# Patient Record
Sex: MALE | Race: WHITE
[De-identification: names, ages, dates, MRNs, and addresses within clinical notes are randomized per-mention and may not be internally consistent; named-entity substitution may affect disease eponyms.]

---

## 2019-02-06 ENCOUNTER — HOSPITAL ENCOUNTER (EMERGENCY)
Dept: HOSPITAL 96 - M.ERS | Age: 64
Discharge: HOME | End: 2019-02-06
Payer: COMMERCIAL

## 2019-02-06 VITALS — BODY MASS INDEX: 27.3 KG/M2 | WEIGHT: 195 LBS | HEIGHT: 71 IN

## 2019-02-06 VITALS — SYSTOLIC BLOOD PRESSURE: 145 MMHG | DIASTOLIC BLOOD PRESSURE: 72 MMHG
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
## 2019-02-06 DIAGNOSIS — Z95.5: ICD-10-CM

## 2019-02-06 DIAGNOSIS — Z88.0: ICD-10-CM

## 2019-02-06 DIAGNOSIS — M19.90: ICD-10-CM

## 2019-02-06 DIAGNOSIS — Z88.8: ICD-10-CM

## 2019-02-06 DIAGNOSIS — S82.492A: Primary | ICD-10-CM

## 2019-02-06 DIAGNOSIS — I25.2: ICD-10-CM

## 2019-02-06 LAB
ABSOLUTE BASOPHILS: 0 THOU/UL (ref 0–0.2)
ABSOLUTE EOSINOPHILS: 0.2 THOU/UL (ref 0–0.7)
ABSOLUTE MONOCYTES: 0.6 THOU/UL (ref 0–1.2)
ALBUMIN SERPL-MCNC: 3.3 G/DL (ref 3.4–5)
ALP SERPL-CCNC: 73 U/L (ref 46–116)
ALT SERPL-CCNC: 23 U/L (ref 30–65)
ANION GAP SERPL CALC-SCNC: 7 MMOL/L (ref 7–16)
APTT BLD: 25.6 SECONDS (ref 25–31.3)
AST SERPL-CCNC: 16 U/L (ref 15–37)
BASOPHILS NFR BLD AUTO: 0.4 %
BILIRUB SERPL-MCNC: 0.5 MG/DL
BUN SERPL-MCNC: 15 MG/DL (ref 7–18)
CALCIUM SERPL-MCNC: 9.3 MG/DL (ref 8.5–10.1)
CHLORIDE SERPL-SCNC: 103 MMOL/L (ref 98–107)
CO2 SERPL-SCNC: 28 MMOL/L (ref 21–32)
CREAT SERPL-MCNC: 1.4 MG/DL (ref 0.6–1.3)
EOSINOPHIL NFR BLD: 3 %
GLUCOSE SERPL-MCNC: 91 MG/DL (ref 70–99)
GRANULOCYTES NFR BLD MANUAL: 57.5 %
HCT VFR BLD CALC: 40.5 % (ref 42–52)
HGB BLD-MCNC: 13.9 GM/DL (ref 14–18)
INR PPP: 1
LYMPHOCYTES # BLD: 2.5 THOU/UL (ref 0.8–5.3)
LYMPHOCYTES NFR BLD AUTO: 31.5 %
MCH RBC QN AUTO: 30.9 PG (ref 26–34)
MCHC RBC AUTO-ENTMCNC: 34.4 G/DL (ref 28–37)
MCV RBC: 89.9 FL (ref 80–100)
MONOCYTES NFR BLD: 7.6 %
MPV: 7.3 FL. (ref 7.2–11.1)
NEUTROPHILS # BLD: 4.6 THOU/UL (ref 1.6–8.1)
NUCLEATED RBCS: 0 /100WBC
PLATELET COUNT*: 200 THOU/UL (ref 150–400)
POTASSIUM SERPL-SCNC: 4.1 MMOL/L (ref 3.5–5.1)
PROT SERPL-MCNC: 6.6 G/DL (ref 6.4–8.2)
PROTHROMBIN TIME: 10.2 SECONDS (ref 9.2–11.5)
RBC # BLD AUTO: 4.5 MIL/UL (ref 4.5–6)
RDW-CV: 16.8 % (ref 10.5–14.5)
SODIUM SERPL-SCNC: 138 MMOL/L (ref 136–145)
TROPONIN-I LEVEL: <0.06 NG/ML (ref ?–0.06)
WBC # BLD AUTO: 8.1 THOU/UL (ref 4–11)

## 2019-02-07 NOTE — EKG
[place] [address]
Phone:  [phone]                     ELECTROCARDIOGRAM REPORT      
_______________________________________________________________________________
 
Name:       MARILOU MCCAIN              [facility]                      [id number]      Account #:      [id number]  
Admission:  19     Attend Phys:                         
Discharge:  19     Date of Birth:  [birth year]  
         Report #: [id number]
    [id number]
_______________________________________________________________________________
THIS REPORT FOR:  //name//                      
 
                         [facility] ED
                                       
Test Date:    2019               Test Time:    17:39:26
Pat Name:     MARILOU MCCAIN          Department:   
Patient ID:   [id number]            Room:          
Gender:       M                        Technician:   BENNIE
:          1955               Requested By: Elli Tran
Order Number: [id number] MD:   Laron Busby
                                 Measurements
Intervals                              Axis          
Rate:         64                       P:            32
LA:           161                      QRS:          -10
QRSD:         98                       T:            -9
QT:           444                                    
QTc:          458                                    
                           Interpretive Statements
Sinus rhythm
Borderline T abnormalities, inferior leads
No previous ECG available for comparison
 
Electronically Signed On 2019 15:29:54 CST by Laron Busby
[email or web address]
 
 
 
 
 
 
 
 
 
 
 
 
 
 
 
 
 
 
 
  <ELECTRONICALLY SIGNED>
                                           By: Laron Busby MD, [facility]     
  19     1529
D: 19 1739   _____________________________________
T: 19 1739   Laron Busby MD, FACC       /EPI

## 2019-04-08 ENCOUNTER — HOSPITAL ENCOUNTER (OUTPATIENT)
Dept: HOSPITAL 96 - M.RAD | Age: 64
End: 2019-04-08
Attending: FAMILY MEDICINE
Payer: COMMERCIAL

## 2019-04-08 DIAGNOSIS — J84.9: ICD-10-CM

## 2019-04-08 DIAGNOSIS — J43.9: Primary | ICD-10-CM

## 2019-05-22 ENCOUNTER — HOSPITAL ENCOUNTER (OUTPATIENT)
Dept: HOSPITAL 96 - M.CRD | Age: 64
End: 2019-05-22
Attending: INTERNAL MEDICINE
Payer: COMMERCIAL

## 2019-05-22 DIAGNOSIS — I35.1: Primary | ICD-10-CM

## 2019-05-22 DIAGNOSIS — Z88.8: ICD-10-CM

## 2019-05-22 DIAGNOSIS — I25.5: ICD-10-CM

## 2019-05-22 DIAGNOSIS — Z88.0: ICD-10-CM

## 2019-06-05 ENCOUNTER — HOSPITAL ENCOUNTER (OUTPATIENT)
Dept: HOSPITAL 96 - M.LAB | Age: 64
End: 2019-06-05
Attending: INTERNAL MEDICINE
Payer: COMMERCIAL

## 2019-06-05 DIAGNOSIS — K74.60: ICD-10-CM

## 2019-06-05 DIAGNOSIS — I77.1: Primary | ICD-10-CM

## 2019-06-05 LAB
ABSOLUTE BASOPHILS: 0.1 THOU/UL (ref 0–0.2)
ABSOLUTE EOSINOPHILS: 0.4 THOU/UL (ref 0–0.7)
ABSOLUTE MONOCYTES: 0.6 THOU/UL (ref 0–1.2)
ALBUMIN SERPL-MCNC: 3.7 G/DL (ref 3.4–5)
ALP SERPL-CCNC: 84 U/L (ref 46–116)
ALT SERPL-CCNC: 22 U/L (ref 30–65)
ANION GAP SERPL CALC-SCNC: 10 MMOL/L (ref 7–16)
AST SERPL-CCNC: 18 U/L (ref 15–37)
BASOPHILS NFR BLD AUTO: 0.9 %
BILIRUB SERPL-MCNC: 0.7 MG/DL
BUN SERPL-MCNC: 18 MG/DL (ref 7–18)
CALCIUM SERPL-MCNC: 9.2 MG/DL (ref 8.5–10.1)
CHLORIDE SERPL-SCNC: 104 MMOL/L (ref 98–107)
CO2 SERPL-SCNC: 24 MMOL/L (ref 21–32)
CREAT SERPL-MCNC: 1 MG/DL (ref 0.6–1.3)
EOSINOPHIL NFR BLD: 3.7 %
GLUCOSE SERPL-MCNC: 114 MG/DL (ref 70–99)
GRANULOCYTES NFR BLD MANUAL: 69.7 %
HCT VFR BLD CALC: 47.5 % (ref 42–52)
HGB BLD-MCNC: 16.1 GM/DL (ref 14–18)
INR PPP: 1.1
IRON SERPL-MCNC: 96 UG/DL (ref 50–175)
LYMPHOCYTES # BLD: 1.9 THOU/UL (ref 0.8–5.3)
LYMPHOCYTES NFR BLD AUTO: 19.3 %
MCH RBC QN AUTO: 30.4 PG (ref 26–34)
MCHC RBC AUTO-ENTMCNC: 33.9 G/DL (ref 28–37)
MCV RBC: 89.5 FL (ref 80–100)
MONOCYTES NFR BLD: 6.4 %
MPV: 8.4 FL. (ref 7.2–11.1)
NEUTROPHILS # BLD: 6.9 THOU/UL (ref 1.6–8.1)
NUCLEATED RBCS: 0 /100WBC
PLATELET # BLD EST: ADEQUATE 10*3/UL
PLATELET CLUMP BLD QL SMEAR: (no result)
PLATELET COUNT*: 171 THOU/UL (ref 150–400)
POTASSIUM SERPL-SCNC: 4.2 MMOL/L (ref 3.5–5.1)
PROT SERPL-MCNC: 7.1 G/DL (ref 6.4–8.2)
PROTHROMBIN TIME: 10.9 SECONDS (ref 9.2–11.5)
RBC # BLD AUTO: 5.31 MIL/UL (ref 4.5–6)
RBC MORPH BLD: NORMAL
RDW-CV: 14 % (ref 10.5–14.5)
SAO2 % BLD FROM PO2: 31 % (ref 20–39)
SODIUM SERPL-SCNC: 138 MMOL/L (ref 136–145)
WBC # BLD AUTO: 9.9 THOU/UL (ref 4–11)

## 2019-06-12 ENCOUNTER — HOSPITAL ENCOUNTER (OUTPATIENT)
Dept: HOSPITAL 96 - M.SUR | Age: 64
Discharge: HOME | End: 2019-06-12
Attending: INTERNAL MEDICINE
Payer: COMMERCIAL

## 2019-06-12 DIAGNOSIS — K74.60: ICD-10-CM

## 2019-06-12 DIAGNOSIS — Z88.0: ICD-10-CM

## 2019-06-12 DIAGNOSIS — I25.10: ICD-10-CM

## 2019-06-12 DIAGNOSIS — E03.9: ICD-10-CM

## 2019-06-12 DIAGNOSIS — K21.9: ICD-10-CM

## 2019-06-12 DIAGNOSIS — K64.4: ICD-10-CM

## 2019-06-12 DIAGNOSIS — Z12.11: Primary | ICD-10-CM

## 2019-06-12 DIAGNOSIS — Z86.010: ICD-10-CM

## 2019-06-12 DIAGNOSIS — M06.9: ICD-10-CM

## 2019-06-12 DIAGNOSIS — I50.9: ICD-10-CM

## 2019-06-12 DIAGNOSIS — I25.2: ICD-10-CM

## 2019-06-12 DIAGNOSIS — Z79.899: ICD-10-CM

## 2019-06-12 DIAGNOSIS — J44.9: ICD-10-CM

## 2019-06-12 DIAGNOSIS — Z98.0: ICD-10-CM

## 2019-06-12 DIAGNOSIS — Z88.8: ICD-10-CM

## 2019-06-12 DIAGNOSIS — Z98.890: ICD-10-CM

## 2019-06-12 DIAGNOSIS — Z87.442: ICD-10-CM

## 2019-09-11 ENCOUNTER — HOSPITAL ENCOUNTER (OUTPATIENT)
Dept: HOSPITAL 96 - M.NUC | Age: 64
End: 2019-09-11
Attending: INTERNAL MEDICINE
Payer: COMMERCIAL

## 2019-09-11 DIAGNOSIS — I25.2: ICD-10-CM

## 2019-09-11 DIAGNOSIS — I25.10: Primary | ICD-10-CM

## 2019-09-11 DIAGNOSIS — Z88.8: ICD-10-CM

## 2019-09-11 DIAGNOSIS — Z79.899: ICD-10-CM

## 2019-09-11 DIAGNOSIS — Z88.0: ICD-10-CM

## 2019-09-11 NOTE — CARDNUC
[place] [address]
Phone:  [phone]                     CARDIAC NUCLEAR IMAGING REPORT
_______________________________________________________________________________
 
Name:         MITCHELL,HUBERT DUANE         Room:                     REG CLI
M.R.#:    [id number]     Account #:     [id number]  
Admission:    09/11/19    Attend Phys:   Hank Cleveland,
Discharge:                Date of Birth: [birth year]  
Date of Service: 09/11/19 1922  Report #:      [id number]
        [id number] 
_______________________________________________________________________________
THIS REPORT FOR:  //name//                      
 
 
--------------- APPROVED REPORT --------------
 
 
Study performed:  09/11/2019 09:39:41
 
Exam: Nuclear Stress Test
Indication: Chest pain, Dyspnea
Patient Location: Out-Patient
Stress Tech: Yamilet Hutchins
Stress Nurse: Romina Norwood RN
 
Ht: 5 ft 11 in  Wt: 190 lbs  BSA:  2.06 m2
    BMI:  26.49
 
Medical History
Medical History: mi, cad,
Medications: carvedilol, asa-81, lasix, brilinta
Allergies: penicillin, pravastatin
Cardiac Risk Factors: age, former tobacco use, family hx
Previous Cardiac Procedures: pci
Exercise History: Sedentary
Meds Held (24 hrs): carvedilol
 
Stress Test Details
Stress Test:  Pharmacologic stress testing performed using 0.4 mg of 
regadenoson per 5 mL given IV over 10 seconds.      
  Reason for pharmacologic stress test: physical limitation.      
  Reversal agent Aminophyline 60 mg, given intravenously for 
dyspnea.      
HR           
Resting HR:            65 bpm   Max Heart Rate (APMHR): 158 bpm  
Max HR Achieved:  85 bpm   Target HR (85% APMHR): 134 bpm  
% of APMHR:         53         
Recovery HR:            82 bpm        
 
BP           
Resting BP:  105/85 mmHg        
Max BP:       155/42 mmHg        
 
ECG           
Resting ECG:  Sinus Rhythm        
Stress ECG:     Sinus Rhythm        
ST Change: None          
 
 
[place] [address]
Phone:  [phone]                     CARDIAC NUCLEAR IMAGING REPORT
_______________________________________________________________________________
 
Name:         MITCHELL,HUBERT DUANE         Room:                     REG CLI
M.R.#:    [id number]     Account #:     [id number]  
Admission:    09/11/19    Attend Phys:   Hank Cleveland,
Discharge:                Date of Birth: [birth year]  
Date of Service: 09/11/19 1922  Report #:      [id number]
        [id number] 
_______________________________________________________________________________
Arrhythmia:    VPC's         
Recovery ECG: Sinus Rhythm        
Recovery ST Change: None        
Recovery Arrhythmia: VPC's        
 
Clinical
Reason for Termination: Completed protocol
Exercise duration: 0 min  sec
Exercise capacity: 1 METs
Patient had no significant cardiac symptoms with Lexiscan 
infusion.
 
Nurse Comments
pt unable to walk on treadmill.  pt uses cane to walk and is on 02 
therapy.  
pt had many pvcs during test
caffeine was used to reverse pt, not aminophylline
 
Stress ECG Conclusion
The baseline EKG shows sinus rhythm with frequent unifocal premature 
ventricular contractions. EKGs obtained during and post Lexiscan 
infusion show sinus rhythm with no significant ST or T wave changes 
when compared to baseline. There continued to be frequent unifocal 
premature ventricular contractions noted.
 
NM EXAM: Myocardial Perfusion REST/STRESS
 
Study Quality
Study: Good
Artifact: No artifact 
 
Study Data
At rest, the left ventricular ejection fraction was 44%..   
Post stress, the left ventricular ejection was 26%..   
TID = 1.09.       
 
Perfusion
Perfusion images show a large in size moderate to severe intensity 
defect involving the basal to distal inferior wall that appears 
somewhat worse on stress than rest images suggesting infarct with 
minimal aleksey-infarct ischemia. Additionally there is moderate region 
of mild intensity reversibility noted in the anterior and 
anterolateral walls
 
Wall Motion
Global LV systolic function appears to be severely decreased. The 
 
 
[place] [address]
Phone:  [phone]                     CARDIAC NUCLEAR IMAGING REPORT
_______________________________________________________________________________
 
Name:         ADÁN,HUBERT DUANE         Room:                     REG CLI
M.R.#:    [id number]     Account #:     [id number]  
Admission:    09/11/19    Attend Phys:   Hank Cleveland,
Discharge:                Date of Birth: [birth year]  
Date of Service: 09/11/19 1922  Report #:      [id number]
        [id number] 
_______________________________________________________________________________
inferior wall is akinetic. The apex is severely hypokinetic. The left 
ventricle appears moderately dilated.
 
Nuclear Conclusion
ECG Findings: negative for ischemia
Clinical Findings: negative for ischemia
Nuclear Findings: positive for ischemia
Exercise Capacity: not assessed
Left Ventricular Function: abnormal
Risk Study: high
Myocardial perfusion images suggest prior infarct of the inferior 
wall with residual ischemia in the inferior wall. There is also 
evidence of ischemia in the anterolateral wall. There is significant 
LV systolic dysfunction with wall motion abnormalities as outlined 
above. This is a high risk study. 
 
<Conclusion>
The baseline EKG shows sinus rhythm with frequent unifocal premature 
ventricular contractions. EKGs obtained during and post Lexiscan 
infusion show sinus rhythm with no significant ST or T wave changes 
when compared to baseline. There continued to be frequent unifocal 
premature ventricular contractions noted.
 
 
 
 
 
 
 
 
 
 
 
 
 
 
 
 
 
 
 
 
 
 
  <ELECTRONICALLY SIGNED>
                                           [facility]: [facility] Cleveland [facility], [facility]   
  09/11/19 1922
D: 09/11/19 1922   _____________________________________
T: 09/11/19 1922   Hank Cleveland MD, FACC     /INF

## 2019-09-13 ENCOUNTER — HOSPITAL ENCOUNTER (INPATIENT)
Dept: HOSPITAL 96 - M.ERS | Age: 64
LOS: 3 days | Discharge: ANOTHER HOSPITAL | DRG: 281 | End: 2019-09-16
Attending: INTERNAL MEDICINE | Admitting: INTERNAL MEDICINE
Payer: COMMERCIAL

## 2019-09-13 VITALS — DIASTOLIC BLOOD PRESSURE: 70 MMHG | SYSTOLIC BLOOD PRESSURE: 114 MMHG

## 2019-09-13 VITALS — BODY MASS INDEX: 44.1 KG/M2 | HEIGHT: 70.98 IN | WEIGHT: 315 LBS

## 2019-09-13 VITALS — DIASTOLIC BLOOD PRESSURE: 55 MMHG | SYSTOLIC BLOOD PRESSURE: 94 MMHG

## 2019-09-13 VITALS — SYSTOLIC BLOOD PRESSURE: 132 MMHG | DIASTOLIC BLOOD PRESSURE: 75 MMHG

## 2019-09-13 DIAGNOSIS — Z79.899: ICD-10-CM

## 2019-09-13 DIAGNOSIS — J44.9: ICD-10-CM

## 2019-09-13 DIAGNOSIS — I21.9: Primary | ICD-10-CM

## 2019-09-13 DIAGNOSIS — N18.3: ICD-10-CM

## 2019-09-13 DIAGNOSIS — K21.9: ICD-10-CM

## 2019-09-13 DIAGNOSIS — Z79.52: ICD-10-CM

## 2019-09-13 DIAGNOSIS — M06.9: ICD-10-CM

## 2019-09-13 DIAGNOSIS — Z87.891: ICD-10-CM

## 2019-09-13 DIAGNOSIS — I12.9: ICD-10-CM

## 2019-09-13 DIAGNOSIS — I25.5: ICD-10-CM

## 2019-09-13 DIAGNOSIS — E03.9: ICD-10-CM

## 2019-09-13 DIAGNOSIS — Z88.0: ICD-10-CM

## 2019-09-13 DIAGNOSIS — E11.22: ICD-10-CM

## 2019-09-13 DIAGNOSIS — E78.5: ICD-10-CM

## 2019-09-13 DIAGNOSIS — E86.0: ICD-10-CM

## 2019-09-13 DIAGNOSIS — M81.0: ICD-10-CM

## 2019-09-13 DIAGNOSIS — I25.2: ICD-10-CM

## 2019-09-13 DIAGNOSIS — J84.9: ICD-10-CM

## 2019-09-13 DIAGNOSIS — Z88.8: ICD-10-CM

## 2019-09-13 DIAGNOSIS — J84.10: ICD-10-CM

## 2019-09-13 DIAGNOSIS — Z79.51: ICD-10-CM

## 2019-09-13 DIAGNOSIS — N17.9: ICD-10-CM

## 2019-09-13 DIAGNOSIS — Z82.3: ICD-10-CM

## 2019-09-13 DIAGNOSIS — I25.110: ICD-10-CM

## 2019-09-13 DIAGNOSIS — Z95.5: ICD-10-CM

## 2019-09-13 DIAGNOSIS — Z79.82: ICD-10-CM

## 2019-09-13 DIAGNOSIS — I24.9: ICD-10-CM

## 2019-09-13 LAB
ABSOLUTE BASOPHILS: 0.1 THOU/UL (ref 0–0.2)
ABSOLUTE EOSINOPHILS: 0.3 THOU/UL (ref 0–0.7)
ABSOLUTE MONOCYTES: 0.6 THOU/UL (ref 0–1.2)
ALBUMIN SERPL-MCNC: 3.7 G/DL (ref 3.4–5)
ALP SERPL-CCNC: 83 U/L (ref 46–116)
ALT SERPL-CCNC: 25 U/L (ref 30–65)
ANION GAP SERPL CALC-SCNC: 8 MMOL/L (ref 7–16)
AST SERPL-CCNC: 18 U/L (ref 15–37)
BASOPHILS NFR BLD AUTO: 1.4 %
BILIRUB SERPL-MCNC: 0.5 MG/DL
BUN SERPL-MCNC: 25 MG/DL (ref 7–18)
CALCIUM SERPL-MCNC: 8.8 MG/DL (ref 8.5–10.1)
CHLORIDE SERPL-SCNC: 103 MMOL/L (ref 98–107)
CO2 SERPL-SCNC: 26 MMOL/L (ref 21–32)
CREAT SERPL-MCNC: 1.1 MG/DL (ref 0.6–1.3)
EOSINOPHIL NFR BLD: 3.6 %
GLUCOSE SERPL-MCNC: 126 MG/DL (ref 70–99)
GRANULOCYTES NFR BLD MANUAL: 66.4 %
HCT VFR BLD CALC: 43.7 % (ref 42–52)
HGB BLD-MCNC: 15.3 GM/DL (ref 14–18)
INR PPP: 1.1
LIPASE: 221 U/L (ref 73–393)
LYMPHOCYTES # BLD: 1.8 THOU/UL (ref 0.8–5.3)
LYMPHOCYTES NFR BLD AUTO: 21.5 %
MCH RBC QN AUTO: 31.5 PG (ref 26–34)
MCHC RBC AUTO-ENTMCNC: 35.1 G/DL (ref 28–37)
MCV RBC: 89.6 FL (ref 80–100)
MONOCYTES NFR BLD: 7.1 %
MPV: 8.2 FL. (ref 7.2–11.1)
NEUTROPHILS # BLD: 5.4 THOU/UL (ref 1.6–8.1)
NT-PRO BRAIN NAT PEPTIDE: 595 PG/ML (ref ?–300)
NUCLEATED RBCS: 0 /100WBC
PLATELET COUNT*: 182 THOU/UL (ref 150–400)
POTASSIUM SERPL-SCNC: 4.3 MMOL/L (ref 3.5–5.1)
PROT SERPL-MCNC: 6.7 G/DL (ref 6.4–8.2)
PROTHROMBIN TIME: 10.9 SECONDS (ref 9.2–11.5)
RBC # BLD AUTO: 4.88 MIL/UL (ref 4.5–6)
RDW-CV: 14.1 % (ref 10.5–14.5)
SODIUM SERPL-SCNC: 137 MMOL/L (ref 136–145)
TROPONIN-I LEVEL: <0.06 NG/ML (ref ?–0.06)
WBC # BLD AUTO: 8.2 THOU/UL (ref 4–11)

## 2019-09-14 VITALS — SYSTOLIC BLOOD PRESSURE: 117 MMHG | DIASTOLIC BLOOD PRESSURE: 64 MMHG

## 2019-09-14 VITALS — DIASTOLIC BLOOD PRESSURE: 64 MMHG | SYSTOLIC BLOOD PRESSURE: 95 MMHG

## 2019-09-14 VITALS — SYSTOLIC BLOOD PRESSURE: 107 MMHG | DIASTOLIC BLOOD PRESSURE: 72 MMHG

## 2019-09-14 VITALS — DIASTOLIC BLOOD PRESSURE: 65 MMHG | SYSTOLIC BLOOD PRESSURE: 94 MMHG

## 2019-09-14 VITALS — SYSTOLIC BLOOD PRESSURE: 100 MMHG | DIASTOLIC BLOOD PRESSURE: 64 MMHG

## 2019-09-14 VITALS — SYSTOLIC BLOOD PRESSURE: 89 MMHG | DIASTOLIC BLOOD PRESSURE: 64 MMHG

## 2019-09-14 VITALS — DIASTOLIC BLOOD PRESSURE: 62 MMHG | SYSTOLIC BLOOD PRESSURE: 95 MMHG

## 2019-09-14 VITALS — SYSTOLIC BLOOD PRESSURE: 97 MMHG | DIASTOLIC BLOOD PRESSURE: 64 MMHG

## 2019-09-14 VITALS — SYSTOLIC BLOOD PRESSURE: 108 MMHG | DIASTOLIC BLOOD PRESSURE: 73 MMHG

## 2019-09-14 VITALS — DIASTOLIC BLOOD PRESSURE: 63 MMHG | SYSTOLIC BLOOD PRESSURE: 114 MMHG

## 2019-09-14 VITALS — DIASTOLIC BLOOD PRESSURE: 69 MMHG | SYSTOLIC BLOOD PRESSURE: 113 MMHG

## 2019-09-14 VITALS — SYSTOLIC BLOOD PRESSURE: 99 MMHG | DIASTOLIC BLOOD PRESSURE: 60 MMHG

## 2019-09-14 VITALS — DIASTOLIC BLOOD PRESSURE: 67 MMHG | SYSTOLIC BLOOD PRESSURE: 96 MMHG

## 2019-09-14 LAB
CHOLEST SERPL-MCNC: 140 MG/DL (ref ?–200)
HDLC SERPL-MCNC: 29 MG/DL (ref 40–?)
LDLC SERPL-MCNC: 92 MG/DL (ref ?–100)
TC:HDL: 4.8 RATIO
TRIGL SERPL-MCNC: 98 MG/DL (ref ?–150)
VLDLC SERPL CALC-MCNC: 20 MG/DL (ref ?–40)

## 2019-09-14 NOTE — NUR
ASSUMED CARE OF PATIENT THIS AM AT 0730.  PATIENT IS ALERT AND ORIENTED X 4.
HE C/O CONTINUED CHEST PAIN AT 4 ON A SCALE OF 1 TO 10.  HIS SKIN IS WARM AND
DRY.  TELE SHOWS SR WITH [facility].  DR CHRISTIANSON IN TO ROUND.  STAT CARDIAC CATH
ORDERED.  IV FLUIDS STARTED AT 75/HR.  CONSENT SIGNED.

## 2019-09-14 NOTE — EKG
[place] [address]
Phone:  [phone]                     ELECTROCARDIOGRAM REPORT      
_______________________________________________________________________________
 
Name:       MITCHELL,HUBERT DUANE          Room:           [address]    ADM [place]  
M.R.#:  [id number]      Account #:      [id number]  
Admission:  19     Attend Phys:    ANTONIO Carter
Discharge:               Date of Birth:  [birth year]  
         Report #: [id number]
    [id number]
_______________________________________________________________________________
THIS REPORT FOR:  //name//                      
 
                         [facility] ED
                                       
Test Date:    2019               Test Time:    22:51:33
Pat Name:     MARILOU MCCAIN          Department:   
Patient ID:   [id number]            Room:         [address]
Gender:       M                        Technician:   FL
:          1955               Requested By: Elli Parker
Order Number: [id number] MD:   Laron Busby
                                 Measurements
Intervals                              Axis          
Rate:         82                       P:            43
DE:           165                      QRS:          -11
QRSD:         107                      T:            1
QT:           436                                    
QTc:          510                                    
                           Interpretive Statements
Sinus rhythm
Paired ventricular premature complexes
Incomplete left bundle branch block
Inferior infarct, old
Compared to ECG 2019 17:39:26
Ventricular premature complex(es) now present
incomplete Left bundle-branch block now present
T-wave abnormality no longer present
 
Electronically Signed On 2019 12:55:18 CDT by Laron Busby
[email or web address]
 
 
 
 
 
 
 
 
 
 
 
 
 
 
  <ELECTRONICALLY SIGNED>
                                           By: Laron Busby MD, [facility]     
  19     1255
D: 19 2251   _____________________________________
T: 19 2251   Laron Busby MD, FAC       /EPI

## 2019-09-14 NOTE — NUR
Pt is A&O. Resides at home with his wife. Wears home o2 and uses a cane for
mobility. Per nurse, Pt will need to transfer to a different hospital early
next week for open heart surgery. Following.

## 2019-09-14 NOTE — EKG
[place] [address]
Phone:  [phone]                     ELECTROCARDIOGRAM REPORT      
_______________________________________________________________________________
 
Name:       MITCHELL,HUBERT DUANE          Room:           [address]    ADM [place]  
M.R.#:  [id number]      Account #:      [id number]  
Admission:  19     Attend Phys:    ANTONIO Carter
Discharge:               Date of Birth:  [birth year]  
         Report #: [id number]
    [id number]
_______________________________________________________________________________
THIS REPORT FOR:  //name//                      
 
                         [facility] ED
                                       
Test Date:    2019               Test Time:    19:57:37
Pat Name:     MARILOU MCCAIN          Department:   
Patient ID:   [id number]            Room:         [facility]
Gender:       M                        Technician:   FL
:          1955               Requested By: Elli Parker
Order Number: [id number] MD:   Laron Busby
                                 Measurements
Intervals                              Axis          
Rate:         72                       P:            45
MA:           162                      QRS:          -9
QRSD:         104                      T:            4
QT:           401                                    
QTc:          439                                    
                           Interpretive Statements
Sinus arrhythmia
Multiple ventricular premature complexes
Low voltage, extremity leads
possible inferior scar
Compared to ECG 2019 17:39:26
Ventricular premature complex(es) now present
Low QRS voltage now present
T-wave abnormality no longer present
 
Electronically Signed On 2019 12:53:35 CDT by Laron Busby
[email or web address]
 
 
 
 
 
 
 
 
 
 
 
 
 
 
  <ELECTRONICALLY SIGNED>
                                           By: Laron Busby MD, [facility]     
  19     1253
D: 19   _____________________________________
T: 19   Laron Busby MD, FAC       /EPI

## 2019-09-14 NOTE — CON
[address]  [address]                    CONSULTATION                  
_______________________________________________________________________________
 
Name:       ADÁNMARILOU DUANE          Room:           M219    ADM IN  
M.R.#:  [id number]      Account #:      [id number]  
Admission:  09/13/19     Attend Phys:    ANTONIO Carter
Discharge:               Date of Birth:  [birth year]  
         Report #: [id number]
                                                                     [id number]  
_______________________________________________________________________________
THIS REPORT FOR:  //name//                      
 
CC: Myles Chanel DO
 
DATE OF SERVICE:  09/14/2019
 
 
CARDIOLOGY CONSULTATION
 
He is in room #219.
 
Thank you for allowing us to see the patient in cardiovascular assessment.  As
you know, he is a 63-year-old male with complex coronary artery disease, status
post prior myocardial infarction and stenting.  Recently, he has noted recurrent
chest pain following a pattern of clinical instability.  Over the last several
days, he has had frequent episodes of chest discomfort similar to his prior
ischemic pain lasting several minutes.  Recent nuclear stress test was abnormal
with inferior wall infarction and aleksey-infarction ischemia and anterolateral
ischemia as well.
 
The patient denies shortness of breath or diaphoresis accompanying his pain.
 
He has a number of risk factors for coronary disease, including prior cigarette
smoking, hypercholesterolemia, diabetes and family history of coronary disease. 
There is a remote history of renal insufficiency, which is abated.
 
The patient has a known ischemic cardiomyopathy with ejection fraction of
approximately 35% with inferior wall motion abnormalities.
 
PAST MEDICAL HISTORY:  Remarkable for interstitial lung disease, kidney stones
with lithotripsy, prior acute renal insufficiency, which resolved.
 
FAMILY HISTORY:  Remarkable for a sister having a stroke.  Another sister with
stroke in her 60s.
 
SOCIAL HISTORY:  The patient is retired.  He drinks occasionally and no longer
smokes, but did in the past.
 
REVIEW OF SYSTEMS:  Remarkable for the following:
RESPIRATORY:  He notes COPD with chronic dyspnea.
CARDIOVASCULAR:  He has noted palpitations and chest discomfort compatible with
recurrent angina with clinical instability.
ENDOCRINE:  He has treated thyroid problems.
GASTROINTESTINAL:  History of liver disease.
 
 
 
[place] [address]                    CONSULTATION                  
_______________________________________________________________________________
 
Name:       MITCHELL,HUBERT DUANE          Room:           [address]#:  [id number]      Account #:      [id number]  
Admission:  09/13/19     Attend Phys:    ANTONIO Carter
Discharge:               Date of Birth:  [birth year]  
         Report #: [id number]
                                                                     [id number]  
_______________________________________________________________________________
HEMATOLOGIC AND LYMPHATIC:  He has a history of mild anemia.
ALLERGIC AND IMMUNOLOGIC:  HE NOTES MEDICATION ALLERGIES TO PENICILLIN AND
PRAVASTATIN.
MUSCULOSKELETAL:  He has a history of rheumatoid arthritis as well as
osteoarthritis.
SKIN:  He has had a prior groin rash.
EYES:  He wears glasses.
ENT:  Unremarkable.
 
PHYSICAL EXAMINATION:
GENERAL:  Demonstrates a middle-aged male, utilizes a cane for ambulation.
VITAL SIGNS:  Blood pressure 130/70, pulse rate is 74, respirations are 18 per
minute.
NECK:  Jugular venous pressure is normal.
CHEST:  Reveals slightly decreased breath sounds diffusely without rales or
rhonchi.
CARDIAC:  Reveals normal first and second heart sounds with a soft systolic
murmur.
ABDOMEN:  Soft and nontender, without masses or organomegaly.
EXTREMITIES:  Reveal intact femoral and pedal pulses with the femorals being 2+
on the right and 1+ on the left.
 
LABORATORY DATA:  Remarkable for sodium 137, potassium 4.3, BUN 25, creatinine
1.1.
 
Recent nuclear stress test revealed inferior scar with aleksey-infarction ischemia
and inducible anterolateral ischemia.
 
EKG revealed sinus rhythm with moderate number of PVCs.  No acute changes of
ischemia or injury.
 
IMPRESSION:
1.  Chest pain with a pattern compatible with unstable angina.
2.  Coronary artery disease, status post prior myocardial infarction affecting
the inferior wall.
3.  Ischemic cardiomyopathy.
4.  Prior cigarette smoking.
5.  Positive family history of vascular disease.
6.  Chronic obstructive pulmonary disease.
7.  Hyperlipidemia.
8.  Type 2 diabetes.
 
RECOMMENDATIONS:  Given the aforementioned clinical scenario with an increasing
frequency of episodes of chest discomfort compatible with angina and the
abnormal nuclear stress test, I would recommend proceeding with cardiac
 
 
 
[place] [address]                    CONSULTATION                  
_______________________________________________________________________________
 
Name:       ADÁN,HUBERT DUANE          Room:           [address] [place]  
[facility]#:  [id number]      Account #:      [id number]  
Admission:  09/13/19     Attend Phys:    ANTONIO Carter
Discharge:               Date of Birth:  [birth year]  
         Report #: [id number]
                                                                     [id number]  
_______________________________________________________________________________
catheterization urgently.  This will be undertaken on 09/14/2019.  The procedure
and risks have been discussed with the patient.
 
 
 
 
 
 
 
 
 
 
 
 
 
 
 
 
 
 
 
 
 
 
 
 
 
 
 
 
 
 
 
 
 
 
 
 
 
 
 
 
 
 
<ELECTRONICALLY SIGNED>
                                        By:  Laron Busby MD, [facility]     
09/14/19     1246
D: 09/14/19 1033_______________________________________
T: 09/14/19 1122Joelif Busby MD, [facility]        /nt

## 2019-09-15 VITALS — SYSTOLIC BLOOD PRESSURE: 110 MMHG | DIASTOLIC BLOOD PRESSURE: 57 MMHG

## 2019-09-15 VITALS — SYSTOLIC BLOOD PRESSURE: 96 MMHG | DIASTOLIC BLOOD PRESSURE: 62 MMHG

## 2019-09-15 VITALS — SYSTOLIC BLOOD PRESSURE: 99 MMHG | DIASTOLIC BLOOD PRESSURE: 60 MMHG

## 2019-09-15 VITALS — DIASTOLIC BLOOD PRESSURE: 61 MMHG | SYSTOLIC BLOOD PRESSURE: 114 MMHG

## 2019-09-15 VITALS — DIASTOLIC BLOOD PRESSURE: 59 MMHG | SYSTOLIC BLOOD PRESSURE: 101 MMHG

## 2019-09-15 VITALS — SYSTOLIC BLOOD PRESSURE: 101 MMHG | DIASTOLIC BLOOD PRESSURE: 61 MMHG

## 2019-09-15 LAB
ANION GAP SERPL CALC-SCNC: 7 MMOL/L (ref 7–16)
BUN SERPL-MCNC: 17 MG/DL (ref 7–18)
CALCIUM SERPL-MCNC: 8.1 MG/DL (ref 8.5–10.1)
CHLORIDE SERPL-SCNC: 103 MMOL/L (ref 98–107)
CO2 SERPL-SCNC: 27 MMOL/L (ref 21–32)
CREAT SERPL-MCNC: 1.1 MG/DL (ref 0.6–1.3)
GLUCOSE SERPL-MCNC: 118 MG/DL (ref 70–99)
HCT VFR BLD CALC: 39.8 % (ref 42–52)
HGB BLD-MCNC: 13.7 GM/DL (ref 14–18)
MCH RBC QN AUTO: 31.3 PG (ref 26–34)
MCHC RBC AUTO-ENTMCNC: 34.5 G/DL (ref 28–37)
MCV RBC: 90.7 FL (ref 80–100)
MPV: 7.4 FL. (ref 7.2–11.1)
PLATELET COUNT*: 139 THOU/UL (ref 150–400)
POTASSIUM SERPL-SCNC: 4.3 MMOL/L (ref 3.5–5.1)
RBC # BLD AUTO: 4.38 MIL/UL (ref 4.5–6)
RDW-CV: 13.5 % (ref 10.5–14.5)
SODIUM SERPL-SCNC: 137 MMOL/L (ref 136–145)
WBC # BLD AUTO: 6.3 THOU/UL (ref 4–11)

## 2019-09-15 NOTE — CARD
[address]  [address]                    CARDIAC CATH REPORT           
_______________________________________________________________________________
 
Name:       MITCHELL,HUBERT DUANE          Room:           [address] [place]  
M.R.#:  [id number]      Account #:      [id number]  
Admission:  09/13/19     Attend Phys:    Laron Busby MD, 
Discharge:               Date of Birth:  [birth year]  
         Report #: [id number]
                                                                     [id number]
_______________________________________________________________________________
THIS REPORT FOR:  //name//                      
 
 
--------------- APPROVED REPORT --------------
 
 
Study performed:  09/14/2019 10:57:31
 
Patient Details
The patient is a 63 year-old male
 
Event Personnel
Laron Busby  Cardiologist, Monique Bar RN Circulator, Augusta Foster RN Circulator, Ra Patino (R) Fili Walton Jessie RTR Monitor
 
Procedures Performed
Art Access - R femoral artery*  Left Heart Cath w/or w/o Coronaries 
LHC Hemostasis w/ Mynx
 
Indication
Unstable angina 
 
Risk Factors
Hypercholesterolemia, Hypertension
 
Previous Procedures/Diagnoses
Previous PCI, Previous MI
 
Procedure Narrative
The patient was brought electively to the Cardiac Catheterization 
Laboratory and was prepped and draped in a sterile manner. The right 
femoral was infiltrated with 2% Lidocaine subcutaneous anesthesia. A 
[place] 6 FR sheath was inserted into the right femoral artery. 
Coronary angiography was performed using coronary diagnostic 
catheters. The right coronary system was accessed and visualized with 
a JR 4 6F catheter. The left coronary system was accessed and 
visualized with a JL 4 6F catheter. The left ventricle was accessed 
and visualized with a Straight Pig 6F catheter. Left 
ventricular/Aortic Valve gradient assessed via catheter pullback. 
Left ventriculogram was performed in BROWN projection. Pre-demployment 
femoral angiogram was performed . Closure device was deployed with a 
Fr MynxGrip 6/7F. The patient tolerated the procedure well and there 
were no complications associated with the procedure. There was no 
hematoma.
 
 
 
 
[place] [address]                    CARDIAC CATH REPORT           
_______________________________________________________________________________
 
Name:       ADÁNMARILOU DUANE          Room:           [address]#:  [id number]      Account #:      [id number]  
Admission:  09/13/19     Attend Phys:    Laron Busby MD, 
Discharge:               Date of Birth:  [birth year]  
         Report #: [id number]
                                                                     [id number]
_______________________________________________________________________________
Intraoperative Conscious Sedation
Sedation start time:  1142           Case end Time:  
1212    
Fentanyl  25 mcg    Versed  2 mg  
 
Fluoro Time:    4.8 minutes    
Dose:     DAP 42148 cGycm2  785 mGy  
Contrast Type and Amount:  Visipaque 180 ml   
 
Coronary Angiography
The patient's coronary anatomy is right dominant. 
 
Diagnostic Cath
Left Main 50% ostial and distal left main coronary stenosis
LAD  80% proximal LAD stenosis with widely patent mid LAD stent and 
30% mid LAD narrowing beyond the stent
Circumflex 90% proximal circumflex stenosis
Right Coronary Dominant vessel with 60% proximal narrowing and 30% 
mid and distal narrowings
 
Left Ventriculography
The left ventricle is normal in size with reduced contractility. The 
left ventricular ejection fraction is estimated to be 35%. Left 
ventricular wall motion abnormalities are present. There is no mitral 
insufficiency. There is akinesis of the basilar two thirds of the 
inferior wall
 
Hemodynamics
The aortic pressure is 100/55 mmHg with a mean of 65 mmHg. The left 
ventricular pressure is 102/0 mmHg with a mean of mmHg. The left 
ventricular end diastolic pressure is 9 mmHg. There was no gradient 
across the aortic valve upon pullback. 
 
Conclusion
#1 severe coronary artery disease characterized by the following:
 
A 50% ostial and distal left main coronary stenosis
 
B 80% proximal LAD stenosis with a widely patent mid LAD stent and 
30% narrowing beyond the stent
 
C 90% proximal circumflex stenosis
 
B 60% ostial and proximal right coronary stenosis with 30% mid and 
distal narrowings
 
 
 
 
[place] [address]                    CARDIAC CATH REPORT           
_______________________________________________________________________________
 
Name:       ADÁN,HUBERT DUANE          Room:           [address] [place]  
[facility].#:  [id number]      Account #:      [id number]  
Admission:  09/13/19     Attend Phys:    Laron Busby MD, 
Discharge:               Date of Birth:  [birth year]  
         Report #: [id number]
                                                                     [id number]
_______________________________________________________________________________
#2 moderate impairment in global left ventricular systolic function, 
estimate ejection fraction of 35% with akinesis of the basilar two 
thirds of the inferior wall
 
#3 normal left-sided hemodynamics study 
 
Recommendations
Cardiac Risk Reduction Program
CABG
 
Diagnostic Cath Approved by: Laron Busby MD        Date/Time: 
09/15/2019 10:33:23
 
 
 
 
 
 
 
 
 
 
 
 
 
 
 
 
 
 
 
 
 
 
 
 
 
 
 
 
 
 
 
 
<ELECTRONICALLY SIGNED>
                                        By:  Laron Busby MD, [facility]     
09/15/19     1035
D: 09/15/19 1035_______________________________________
T: 09/15/19 1035Laron Busby MD, FACC        /INF

## 2019-09-15 NOTE — NUR
ASSUMED CARE OF PATIENT THIS AM AT 0730.  PATIENT C/O CHEST PAIN THIS AM.
HEPARIN GTT INFUSING AT 1181 UNITS /HR.  PTT THERAPUETIC.  PATIENT MEDICATED
FOR PAIN.  PLAN TO TRANSFER TO [facility] IN THE AM.  TELE SHOWS CONTINUED NSR.
NO FALLS OR INJURY.  WILL CONTINUE TO MONITOR COMFORT AND LABS.

## 2019-09-16 ENCOUNTER — HOSPITAL ENCOUNTER (INPATIENT)
Dept: HOSPITAL 35 - 2N | Age: 64
LOS: 3 days | Discharge: HOME | DRG: 246 | End: 2019-09-19
Attending: HOSPITALIST | Admitting: HOSPITALIST
Payer: COMMERCIAL

## 2019-09-16 VITALS — WEIGHT: 195.9 LBS | HEIGHT: 71 IN | BODY MASS INDEX: 27.43 KG/M2

## 2019-09-16 VITALS — SYSTOLIC BLOOD PRESSURE: 109 MMHG | DIASTOLIC BLOOD PRESSURE: 58 MMHG

## 2019-09-16 VITALS — SYSTOLIC BLOOD PRESSURE: 98 MMHG | DIASTOLIC BLOOD PRESSURE: 57 MMHG

## 2019-09-16 VITALS — DIASTOLIC BLOOD PRESSURE: 69 MMHG | SYSTOLIC BLOOD PRESSURE: 115 MMHG

## 2019-09-16 VITALS — SYSTOLIC BLOOD PRESSURE: 98 MMHG | DIASTOLIC BLOOD PRESSURE: 56 MMHG

## 2019-09-16 VITALS — SYSTOLIC BLOOD PRESSURE: 101 MMHG | DIASTOLIC BLOOD PRESSURE: 56 MMHG

## 2019-09-16 VITALS — DIASTOLIC BLOOD PRESSURE: 57 MMHG | SYSTOLIC BLOOD PRESSURE: 98 MMHG

## 2019-09-16 VITALS — DIASTOLIC BLOOD PRESSURE: 62 MMHG | SYSTOLIC BLOOD PRESSURE: 103 MMHG

## 2019-09-16 DIAGNOSIS — J44.9: ICD-10-CM

## 2019-09-16 DIAGNOSIS — Z88.8: ICD-10-CM

## 2019-09-16 DIAGNOSIS — M19.90: ICD-10-CM

## 2019-09-16 DIAGNOSIS — Z99.81: ICD-10-CM

## 2019-09-16 DIAGNOSIS — Z95.5: ICD-10-CM

## 2019-09-16 DIAGNOSIS — I25.5: ICD-10-CM

## 2019-09-16 DIAGNOSIS — Z87.891: ICD-10-CM

## 2019-09-16 DIAGNOSIS — I73.9: ICD-10-CM

## 2019-09-16 DIAGNOSIS — J96.11: ICD-10-CM

## 2019-09-16 DIAGNOSIS — M06.9: ICD-10-CM

## 2019-09-16 DIAGNOSIS — E11.9: ICD-10-CM

## 2019-09-16 DIAGNOSIS — M81.0: ICD-10-CM

## 2019-09-16 DIAGNOSIS — I50.23: ICD-10-CM

## 2019-09-16 DIAGNOSIS — Z87.442: ICD-10-CM

## 2019-09-16 DIAGNOSIS — I25.110: Primary | ICD-10-CM

## 2019-09-16 DIAGNOSIS — I35.1: ICD-10-CM

## 2019-09-16 DIAGNOSIS — D64.9: ICD-10-CM

## 2019-09-16 DIAGNOSIS — Z82.3: ICD-10-CM

## 2019-09-16 DIAGNOSIS — E78.5: ICD-10-CM

## 2019-09-16 DIAGNOSIS — I25.2: ICD-10-CM

## 2019-09-16 DIAGNOSIS — Z88.0: ICD-10-CM

## 2019-09-16 DIAGNOSIS — E03.9: ICD-10-CM

## 2019-09-16 DIAGNOSIS — J84.9: ICD-10-CM

## 2019-09-16 LAB
ALBUMIN SERPL-MCNC: 3.5 G/DL (ref 3.4–5)
ALT SERPL-CCNC: 21 U/L (ref 30–65)
ANION GAP SERPL CALC-SCNC: 10 MMOL/L (ref 7–16)
APTT BLD: 34.1 SECONDS (ref 24.5–32.8)
AST SERPL-CCNC: 17 U/L (ref 15–37)
BASOPHILS NFR BLD AUTO: 1 % (ref 0–2)
BILIRUB SERPL-MCNC: 0.5 MG/DL
BILIRUB UR-MCNC: NEGATIVE MG/DL
BUN SERPL-MCNC: 18 MG/DL (ref 7–18)
CALCIUM SERPL-MCNC: 9.6 MG/DL (ref 8.5–10.1)
CHLORIDE SERPL-SCNC: 102 MMOL/L (ref 98–107)
CO2 SERPL-SCNC: 27 MMOL/L (ref 21–32)
COLOR UR: YELLOW
CREAT SERPL-MCNC: 1.1 MG/DL (ref 0.7–1.3)
EOSINOPHIL NFR BLD: 4.5 % (ref 0–3)
ERYTHROCYTE [DISTWIDTH] IN BLOOD BY AUTOMATED COUNT: 14 % (ref 10.5–14.5)
GLUCOSE SERPL-MCNC: 90 MG/DL (ref 74–106)
GRANULOCYTES NFR BLD MANUAL: 69 % (ref 36–66)
HCT VFR BLD CALC: 43.2 % (ref 42–52)
HGB BLD-MCNC: 14.5 GM/DL (ref 14–18)
INR PPP: 1
KETONES UR STRIP-MCNC: NEGATIVE MG/DL
LYMPHOCYTES NFR BLD AUTO: 19.6 % (ref 24–44)
MCH RBC QN AUTO: 30.8 PG (ref 26–34)
MCHC RBC AUTO-ENTMCNC: 33.7 G/DL (ref 28–37)
MCV RBC: 91.6 FL (ref 80–100)
MONOCYTES NFR BLD: 5.9 % (ref 1–8)
NEUTROPHILS # BLD: 4.3 THOU/UL (ref 1.4–8.2)
PLATELET # BLD: 148 THOU/UL (ref 150–400)
POTASSIUM SERPL-SCNC: 4.5 MMOL/L (ref 3.5–5.1)
PROT SERPL-MCNC: 6.4 G/DL (ref 6.4–8.2)
PROTHROMBIN TIME: 10.7 SECONDS (ref 9.3–11.4)
RBC # BLD AUTO: 4.71 MIL/UL (ref 4.5–6)
RBC # UR STRIP: (no result) /UL
SODIUM SERPL-SCNC: 139 MMOL/L (ref 136–145)
SP GR UR STRIP: <= 1.005 (ref 1–1.03)
URINE CLARITY: CLEAR
URINE GLUCOSE-RANDOM*: NEGATIVE
URINE LEUKOCYTES-REFLEX: NEGATIVE
URINE NITRITE-REFLEX: NEGATIVE
URINE PROTEIN (DIPSTICK): NEGATIVE
UROBILINOGEN UR STRIP-ACNC: 2 E.U./DL (ref 0.2–1)
WBC # BLD AUTO: 6.3 THOU/UL (ref 4–11)

## 2019-09-16 PROCEDURE — 10081 I&D PILONIDAL CYST COMP: CPT

## 2019-09-16 NOTE — 2DMMODE
[facility]
[address] [place]  [address]
Phone:  [phone] 2 D/M-MODE ECHOCARDIOGRAM     
_______________________________________________________________________________
 
Name:            MITCHELL,HUBERT DUANE         Room #:        213-P       ADM [place]
..#:           [id number]          Account #:     [id number]  
Admission:       19         Attend Phys:   Bert Trent MD 
Discharge:                  Date of Birth: [birth year]  
Date of Service: 19 1609               Report #:      [id number]
        [id number]
_______________________________________________________________________________
THIS REPORT FOR:   //name//                          
 
 
--------------- ADDENDUM APPROVED REPORT --------------
 
 
Study performed:  2019 14:42:36
 
EXAM: Comprehensive 2D, Doppler, and color-flow 
Echocardiogram 
Patient Location: Bedside   
Room #:  213     Status:  routine
 
       BSA:         2.10
HR: 56 bpm  BP:          120/60 mmHg 
Rhythm: NSR/PVCs     
 
Other Information 
Study Quality: Adequate
 
Indications
CAD, Cardiomyopathy.
 
2D Dimensions
RVDd:  37.90 mm  
IVSd:  9.42 (7-11mm) LVOT Diam:  25.63 (18-24mm) 
LVDd:  61.51 mm  
PWd:  9.79 (7-11mm) Ascending Ao:  37.23 (22-36mm)
LVDs:  53.23 (25-40mm) 
Aortic Root:  46.44 mm 
 
Volumes
Left Atrial Volume (Systole) 
Single Plane 4CH:  64.26 mL Single Plane 2CH:  53.50 mL
    LA ESV Index:  34.00 mL/m2
 
Aortic Valve
AoV Peak Jose Luis.:  1.69 m/s 
AO Peak Gr.:  11.41 mmHg LVOT Max P.34 mmHg
    LVOT Max V:  0.77 m/s
ROBLES Vmax: 2.34 cm2  
 
Mitral Valve
    E/A Ratio:  0.8
    MV Decel. Time:  433.62 ms
MV E Max Jose Luis.:  0.50 m/s 
 
 
[facility]
[address]
[place]  [address]
Phone:  [phone]                    2 D/M-MODE ECHOCARDIOGRAM     
_______________________________________________________________________________
 
Name:            ADÁNMARILOU DUANE         Room #:        [facility] [place]
[facility].#:           [id number]          Account #:     [id number]  
Admission:       19         Attend Phys:   Bert Trent MD 
Discharge:                  Date of Birth: [birth year]  
Date of Service: 19 1609               Report #:      [id number]
        [id number]
_______________________________________________________________________________
MV A Jose Luis.:  0.66 m/s  
MV PHT:  125.75 ms  
IVRT:  143.02 ms  
 
Pulmonary Valve
PV Peak Jose Luis.:  0.50 m/s PV Peak Gr.:  1.00 mmHg
 
Pulmonary Vein
P Vein S:    0.43 m/s P Vein A:  0.25 m/s
P Vein D:   0.34 m/s P Vein A Dur.:  115.3 msec
P Vein S/D Ratio:  1.26 
 
Tricuspid Valve
TR Peak Jose Luis.:  2.49 m/s  RAP Estimate:  5.00 mmHg
TR Peak Gr.:  24.85 mmHg 
    PA Pressure:  30.00 mmHg
 
Left Ventricle
Left ventricle is moderately dilated. There is global hypokinesis of 
the left ventricle. There is normal left ventricular wall thickness. 
Left ventricular systolic function is moderate to severely decreased. 
LVEF is 30-35%. Severe hypokinesis of the base of the inferior wall 
and base of the septum. Mild diastolic dysfunction is present 
(impaired relaxation pattern).
 
Right Ventricle
The right ventricle is normal size. The right ventricular systolic 
function is normal.
 
Atria
Left atrium is at the upper limits of normal. The atrial septum is 
aneurysmal. Right atrium is at the upper limits of normal.
 
Aortic Valve
The aortic valve is mildly sclerotic Moderate aortic regurgitation. 
There is no aortic valvular stenosis.
 
Mitral Valve
The mitral valve is normal in structure. Trace mitral 
regurgitation.
 
Tricuspid Valve
The tricuspid valve is normal in structure. Mild to moderate 
tricuspid regurgitation. Estimated PAP is 30-35mmHg.
 
Pulmonic Valve
 
 
[address]  [address]
Phone:  [phone]                    2 D/M-MODE ECHOCARDIOGRAM     
_______________________________________________________________________________
 
Name:            MITCHELL,HUBERT DUANE         Room #:        213-P       [facility] [place]
..#:           [id number]          Account #:     [id number]  
Admission:       19         Attend Phys:   Bert Trent MD 
Discharge:                  Date of Birth: [birth year]  
Date of Service: 19 1609               Report #:      [id number]
        [id number]
_______________________________________________________________________________
The pulmonary valve is normal in structure. Trace pulmonic 
regurgitation.
 
Great Vessels
Aortic root is moderately dilated at 4.6cm. The ascending aorta is 
normal in size. IVC is normal in size and collapses >50% with 
inspiration.
 
Pericardium
There is no pericardial effusion.
 
<Conclusion>
Left ventricular systolic function is moderate to severely 
decreased.
There is global hypokinesis of the left ventricle.
LVEF is 30-35%. Severe hypokinesis of the base of the inferior wall 
and base of the septum.
Mild diastolic dysfunction.  Atrial septal aneurysm
The aortic valve is mildly sclerotic. Moderate aortic 
regurgitation.
The mitral valve is normal in structure. Trace mitral 
regurgitation.
Mild to moderate tricuspid regurgitation. Estimated pulmonary artery 
pressure of 30-35mmHg.
There is no pericardial effusion.
 
 
 
 
 
 
 
 
 
 
 
 
 
 
 
 
 
 
 
  <ELECTRONICALLY SIGNED>
   By: Mark Rooney MD, FACC   
  19     1609
D: 19 1609                           _____________________________________
T: 19 1609                           Mark Rooney MD, FACC     /INF

## 2019-09-16 NOTE — NUR
PT A/O. TELE TRACKING SR AND ALL VSS ON 3L. DENIES WORSENING CP, SOA. RIGHT
GROIN CATH SITE WNL. WIFE AT BEDSIDE THIS AM. EDUCATED ON SAFETY AND PLAN OF
CARE. PT TO TRANSFER TO [facility] FOR CTS SERVICES. REPOT GIVEN TO DAE MCNEIL. PLEASE SEE ASSESSMENT FOR ADDITIONAL INFORMATION. WILL CONT TO MONITOR

## 2019-09-16 NOTE — NUR
Pt discharging to [facility] for CABG planned for tomorrow. Pt accepted under the
care of Dr Trent to room 213. Nurse report number is [id number]. Updated Pt and
wife in room. Ambulance to  and transport at 1130. Chart copied. EMTALA
form completed, copy to be sent with Pt.

## 2019-09-16 NOTE — NUR
PT TRANSFERED FROM [facility] THIS AFTERNOON.  PT TO SEE DR FRANCE FOR CABG.
PT TO THE UNIT - ORIENTED TO ROOM AND BEDSPACE.  ASSESSMENT AS CHARTED -  MEDS
AS PER MAR -  PT HAS HD PFT'S - ECHO AND LAB COMPLETED.  POST PFT IT WAS
DECISED THAT PATIET WOULD NOT HAVE OPEN HEART TOMORROW - DR WILKES AND SELVIN
CONSULTED,  DR WILKES TO DO PROCEDURE ON WEDNESDAY IN CATH LAB.  HEPARIN DRIP
CONTINUES AS ORDERED.MRSA SWAB SNET TO LAB.  ACCUCHECK AS CHARTED -  NO CO'S
OF PAIN OR NASUEA.  GRAZYNA DIET AND FLUIDS.  NO CO'S AT THE PRESENT TIME.

## 2019-09-17 VITALS — SYSTOLIC BLOOD PRESSURE: 107 MMHG | DIASTOLIC BLOOD PRESSURE: 59 MMHG

## 2019-09-17 VITALS — DIASTOLIC BLOOD PRESSURE: 72 MMHG | SYSTOLIC BLOOD PRESSURE: 124 MMHG

## 2019-09-17 VITALS — SYSTOLIC BLOOD PRESSURE: 98 MMHG | DIASTOLIC BLOOD PRESSURE: 55 MMHG

## 2019-09-17 VITALS — SYSTOLIC BLOOD PRESSURE: 95 MMHG | DIASTOLIC BLOOD PRESSURE: 60 MMHG

## 2019-09-17 VITALS — DIASTOLIC BLOOD PRESSURE: 56 MMHG | SYSTOLIC BLOOD PRESSURE: 89 MMHG

## 2019-09-17 LAB
CHOLEST SERPL-MCNC: 125 MG/DL (ref ?–200)
EST. AVERAGE GLUCOSE BLD GHB EST-MCNC: 103 MG/DL
GLYCOHEMOGLOBIN (HGB A1C): 5.2 % (ref 4.8–5.6)
HDLC SERPL-MCNC: 31 MG/DL (ref 40–?)
LDLC SERPL-MCNC: 79 MG/DL (ref ?–100)
TC:HDL: 4 RATIO
TRIGL SERPL-MCNC: 79 MG/DL (ref ?–150)
VLDLC SERPL CALC-MCNC: 16 MG/DL (ref ?–40)

## 2019-09-17 NOTE — NUR
ASSUMED CARE AT SHIFT CHANGE, ALERT AND ORIENTED X4 AND FORGETFULL. VSS AND
AFEBRILE. NSR, REMAINS ON HEPARIN GTT, AND APTT @ 54, NO CHANGE MADE. NPO MN
POSSIBLE ANGIOPLASTY TOMM WITH DR WILKES. AD WILL CONTINUE WITH POC.

## 2019-09-17 NOTE — EKG
[address]  [address]
Phone:  [phone]                    ELECTROCARDIOGRAM REPORT      
_______________________________________________________________________________
 
Name:       ANDREW MCCAINERT DUANE         Room #:         213-P       ADM [place]  
M.R.#:      [id number]     Account #:      [id number]  
Admission:  19    Attend Phys:    Bert Trent MD     
Discharge:              Date of Birth:  [birth year]  
                                                          Report #: [id number]
   [id number]
_______________________________________________________________________________
THIS REPORT FOR:   //name//                          
 
                          [facility]
                                       
Test Date:    2019               Test Time:    07:06:39
Pat Name:     MARILOU MCCAIN          Department:   
Patient ID:   [id number]            Room:         213 P
Gender:       M                        Technician:   REMINGTON
:          1955               Requested By: Rafael Laboy
Order Number: [id number] MD:   Ken Hampton
                                 Measurements
Intervals                              Axis          
Rate:         58                       P:            47
AK:           174                      QRS:          -3
QRSD:         111                      T:            3
QT:           460                                    
QTc:          452                                    
                           Interpretive Statements
Sinus rhythm
Ventricular premature complex
Borderline low voltage, extremity leads
No previous ECG available for comparison
 
Electronically Signed On 2019 14:12:43 CDT by Ken Hampton
[email or web address]
 
 
 
 
 
 
 
 
 
 
 
 
 
 
 
 
 
 
  <ELECTRONICALLY SIGNED>
   By: Ken Hampton MD        
  19     1412
D: 09/17/19 0706                           _____________________________________
T: 19                           Ken Hampton MD          /LAWRENCE

## 2019-09-17 NOTE — NUR
RECEIVED PT'S CARE AT 1910; AOX4; ON BED; DURING ASSESSMENT C/O PAIN; PRN PAIN
MEDICATION GIVEN; APPT NOT THERAPEUTIC; CHECK CHARTING; HEPARIN BOLUS GIVEN &
GTT ADJUSTED; REFUSED MIRALAX; REFUSED SCDs; MONITORING; ASSESSMENT AS
CHARGED; FOLLOWING POC; WILL PASS ON REPORT.

## 2019-09-18 VITALS — SYSTOLIC BLOOD PRESSURE: 107 MMHG | DIASTOLIC BLOOD PRESSURE: 64 MMHG

## 2019-09-18 VITALS — SYSTOLIC BLOOD PRESSURE: 111 MMHG | DIASTOLIC BLOOD PRESSURE: 62 MMHG

## 2019-09-18 VITALS — DIASTOLIC BLOOD PRESSURE: 66 MMHG | SYSTOLIC BLOOD PRESSURE: 101 MMHG

## 2019-09-18 VITALS — SYSTOLIC BLOOD PRESSURE: 101 MMHG | DIASTOLIC BLOOD PRESSURE: 68 MMHG

## 2019-09-18 VITALS — DIASTOLIC BLOOD PRESSURE: 58 MMHG | SYSTOLIC BLOOD PRESSURE: 101 MMHG

## 2019-09-18 VITALS — DIASTOLIC BLOOD PRESSURE: 66 MMHG | SYSTOLIC BLOOD PRESSURE: 105 MMHG

## 2019-09-18 VITALS — DIASTOLIC BLOOD PRESSURE: 65 MMHG | SYSTOLIC BLOOD PRESSURE: 106 MMHG

## 2019-09-18 VITALS — SYSTOLIC BLOOD PRESSURE: 108 MMHG | DIASTOLIC BLOOD PRESSURE: 64 MMHG

## 2019-09-18 VITALS — DIASTOLIC BLOOD PRESSURE: 73 MMHG | SYSTOLIC BLOOD PRESSURE: 119 MMHG

## 2019-09-18 VITALS — SYSTOLIC BLOOD PRESSURE: 103 MMHG | DIASTOLIC BLOOD PRESSURE: 63 MMHG

## 2019-09-18 VITALS — DIASTOLIC BLOOD PRESSURE: 65 MMHG | SYSTOLIC BLOOD PRESSURE: 103 MMHG

## 2019-09-18 LAB
ANION GAP SERPL CALC-SCNC: 9 MMOL/L (ref 7–16)
BUN SERPL-MCNC: 19 MG/DL (ref 7–18)
CALCIUM SERPL-MCNC: 9.3 MG/DL (ref 8.5–10.1)
CHLORIDE SERPL-SCNC: 103 MMOL/L (ref 98–107)
CO2 SERPL-SCNC: 27 MMOL/L (ref 21–32)
CREAT SERPL-MCNC: 1 MG/DL (ref 0.7–1.3)
ERYTHROCYTE [DISTWIDTH] IN BLOOD BY AUTOMATED COUNT: 13.9 % (ref 10.5–14.5)
GLUCOSE SERPL-MCNC: 101 MG/DL (ref 74–106)
HCT VFR BLD CALC: 41.8 % (ref 42–52)
HGB BLD-MCNC: 14 GM/DL (ref 14–18)
MCH RBC QN AUTO: 30.8 PG (ref 26–34)
MCHC RBC AUTO-ENTMCNC: 33.5 G/DL (ref 28–37)
MCV RBC: 91.9 FL (ref 80–100)
PLATELET # BLD: 150 THOU/UL (ref 150–400)
POTASSIUM SERPL-SCNC: 4.2 MMOL/L (ref 3.5–5.1)
RBC # BLD AUTO: 4.55 MIL/UL (ref 4.5–6)
SODIUM SERPL-SCNC: 139 MMOL/L (ref 136–145)
WBC # BLD AUTO: 6 THOU/UL (ref 4–11)

## 2019-09-18 PROCEDURE — 4A023N7 MEASUREMENT OF CARDIAC SAMPLING AND PRESSURE, LEFT HEART, PERCUTANEOUS APPROACH: ICD-10-PCS | Performed by: INTERNAL MEDICINE

## 2019-09-18 PROCEDURE — 02C13ZZ EXTIRPATION OF MATTER FROM CORONARY ARTERY, TWO ARTERIES, PERCUTANEOUS APPROACH: ICD-10-PCS | Performed by: INTERNAL MEDICINE

## 2019-09-18 PROCEDURE — B2111ZZ FLUOROSCOPY OF MULTIPLE CORONARY ARTERIES USING LOW OSMOLAR CONTRAST: ICD-10-PCS | Performed by: INTERNAL MEDICINE

## 2019-09-18 PROCEDURE — 027135Z DILATION OF CORONARY ARTERY, TWO ARTERIES WITH TWO DRUG-ELUTING INTRALUMINAL DEVICES, PERCUTANEOUS APPROACH: ICD-10-PCS | Performed by: INTERNAL MEDICINE

## 2019-09-18 NOTE — NUR
RECEIVED PT'S CARE AT 1920; PT. ON CHAIR; AOX4; DURING ASSESSMENT NO C/O PAIN;
EDUCATED ABOUT NPO AFTER MIDNIGHT; ST. UNDERSTANDING; HS MEDICATION GIVEN; PRE
CATH CLEANING PERFORMED; SR OVER THE NIGHT; MONITORING; ASSESSMENT AS CHARGED;
FOLLOWING POC; WILL PASS ON REPORT.

## 2019-09-18 NOTE — NUR
ASSESMENT AS DOCUMENTED, BACK FROM CATH AT 1350, SEE POST CARDIAC CATH
INTERVENTIONS. SR, VSS AND DENIES ANY DISCOMFORT. FAMILY AT BEDSIDE. RT GRION
SITE D/C/I.

## 2019-09-18 NOTE — NUR
met with patient who transferred to [facility] from [facility] with unstable angina.
Patient resides at home with family. All needs on one level. PTA independent
with adls and self care Patient has home oxygen usu at 3 liters provided by
[facility]. PCP Dr Schroeder in [place]. Casemgt following for dc planning.

## 2019-09-18 NOTE — EKG
[address]  [address]
Phone:  [phone]                    ELECTROCARDIOGRAM REPORT      
_______________________________________________________________________________
 
Name:       MARILOU MCCAIN DUANE         Room #:         213-P       ADM [place]  
M.R.#:      [id number]     Account #:      [id number]  
Admission:  19    Attend Phys:    Bert Trent MD     
Discharge:              Date of Birth:  [birth year]  
                                                          Report #: [id number]
   [id number]
_______________________________________________________________________________
THIS REPORT FOR:   //name//                          
 
                          [facility]
                                       
Test Date:    2019               Test Time:    14:33:46
Pat Name:     MARILOU MCCAIN          Department:   
Patient ID:   [id number]            Room:         213 P
Gender:       M                        Technician:   francisco pineda
:          1955               Requested By: Jw Haley
Order Number: [id number] MD:   Ken Hampton
                                 Measurements
Intervals                              Axis          
Rate:         64                       P:            37
NM:           178                      QRS:          1
QRSD:         110                      T:            -5
QT:           450                                    
QTc:          465                                    
                           Interpretive Statements
Sinus rhythm
Borderline low voltage, extremity leads
Compared to ECG 2019 07:06:39
Ventricular premature complex(es) no longer present
 
Electronically Signed On 2019 16:32:26 CDT by Ken Hampton
[email or web address]
 
 
 
 
 
 
 
 
 
 
 
 
 
 
 
 
 
 
  <ELECTRONICALLY SIGNED>
   By: Ken Hampton MD        
  19     1632
D: 19 1433                           _____________________________________
T: 19 1433                           Ken Hampton MD          /EPI

## 2019-09-18 NOTE — CATHLAB
[facility]
[address] [place]
[place]  [address]
Phone:  [phone]                    INVASIVE PROCEDURE REPORT     
_______________________________________________________________________________
 
Name:            MITCHELL,HUBERT DUANE         Room #:        213-P       ADM IN
..#:           [id number]          Account #:     [id number]  
Admission:       09/16/19         Attend Phys:   Bert Trent MD 
Discharge:                  Date of Birth: [birth year]  
                         Report #:      [id number]
        [id number]
_______________________________________________________________________________
THIS REPORT FOR:   //name//                          
 
 
--------------- APPROVED REPORT --------------
 
 
Study performed:  09/18/2019 11:04:29
 
Patient Details
Patient Status: In-Patient                  Room #: 
The patient is a 63 year-old male
 
Event Personnel
Jw Haley  Interventional Cardiologist, Jersey Blackburn RN RN, Spencer Mcdaniel RTR Scrub, Magali Ortiz RTR, OSBALDOIS Scrub, Sandifer, David  
Monitor
 
Procedures Performed
77831 Initial Mod Sed Same Phys/QHP Gr5y 834000 66463 Mod Sed Same 
Phys/QHP Ea 192698 TRAVIS w/Atherectomy Single CIRC  DESATH TRAVIS 
w/Atherectomy Single LAD  DESATH Hemostasis w/ 
Mynx
 
Indication
Unstable angina , The patient presented with unstable angina to [facility], undergoing cardiac catheterization. He was 
found to have a severe bifurcating stenosis involving the distal left 
main artery, LAD and left circumflex artery. He was transferred to 
[facility] for CV surgical consultation. He was found 
to have pulmonary fibrosis on chronic oxygen therapy and deemed not a 
suitable candidate for surgery. He presents for a high risk 
angioplasty involving the left main bifurcation.
 
Risk Factors
Chronic Lung DiseaseHypercholesterolemia, Coronary Artery 
DiseaseHypertension
 
Previous Procedures/Diagnoses
Previous PCI, Previous MI
 
Procedure Narrative
The Right Groin^ was infiltrated with 1% Lidocaine subcutaneous 
anesthesia. A PINNACLE 7FR Sheath #[id number] sheath was inserted into 
the RFA^. Coronary angiography was performed using coronary 
diagnostic catheters. The right coronary system was accessed and 
visualized with a 7f xb 3.5 guide catheter. There was no 
 
 
[facility]
[address]
[place]  [address]
Phone:  [phone]                    INVASIVE PROCEDURE REPORT     
_______________________________________________________________________________
 
Name:            MARILOU MCCAIN DUANE         Room #:        213-P       [facility] [place]
[facility]#:           [id number]          Account #:     [id number]  
Admission:       09/16/19         Attend Phys:   Bert Trent MD 
Discharge:                  Date of Birth: [birth year]  
                         Report #:      [id number]
        [id number]
_______________________________________________________________________________
hematoma.
 
Intraoperative Conscious Sedation
Sedation start time:  1130           Case end Time:  
1330    
Fentanyl  100 mcg    Versed  2 mg  
 
Fluoro Time:    30.59 minutes     
Dose:     DAP 37433.30 cGycm2  4810 mGy  
Contrast Type and Amount:  Omnipaque 375 ml    
 
Diagnostic Cath
Left Main Severe distal left main stenosis at the bifurcation 
involving the ostium of the left circumflex artery and 
LAD.
 
Hemodynamics
The aortic pressure is 125/68 mmHg with a mean of 89 mmHg. 
 
PCI Technique Lesion
Percutaneous coronary intervention was performed on the 
ostial/proximal circumflex artery segment. The lesion stenosis prior 
to intervention was 90% with RANCHO 3 flow. A 7F 3.5 XB Guide Catheter 
was used to engage the cirx ostium. A luge .014 wire Interventional 
Guidewire was used to cross the lesion.
 
BALLOON DILATION
A Balloon catheter Euphora RX 2.75 x 12 #[id number] was inserted and 
inflated up to 14.00atm for 10 yesi for 21seconds. Additional 
Inflation: 8atm for 8seconds. Additional Inflation: 10atm for 
5seconds.
 
STENT DEPLOYMENT
A stent RESOLUTE SADE RX 2.75 X 12 #[id number] was inserted and inflated 
up to 14atm for 13seconds. Additional Inflation: 14atm for 
7seconds.
 
POST STENT DEPLOYMENT BALLOON DILATION
Additional Inflation: 16atm for 18seconds.
 
COMMENTS
Laser atherectomy was initially performed prior to balloon 
angioplasty.  3 passes at 50 Fluence 40 rate, 2 passes at 60 fluence 
40 rate. 
 
PCI Technique Lesion 2
 
 
[facility]
[address] [place] [address]
Phone:  [phone]                    INVASIVE PROCEDURE REPORT     
_______________________________________________________________________________
 
Name:            MITCHELL,HUBERT DUANE         Room #:        213-P       [facility]#:           [id number]          Account #:     [id number]  
Admission:       09/16/19         Attend Phys:   Bert Trent MD 
Discharge:                  Date of Birth: [birth year]  
                         Report #:      [id number]
        [id number]
_______________________________________________________________________________
Percutaneous Coronary Intervention was performed on the distal left 
main extending into the proximal LAD. The lesion stenosis prior to 
intervention was 70% with RANCHO 3 flow. A VISTA 7FR XB 3.5 Guide 
Catheter was used to engage the LCA ostium. A Luge Interventional 
Guidewire was used to cross the lesion.
 
Balloon Dilation
A Balloon catheter Euphora RX 2.75 x 12 #[id number] was inserted and 
inflated up to 14atm for 12seconds. Prior to balloon angioplasty, 
laser atherectomy was performed. 2 passes 50 Fluence at 40 
rate.
 
Stent Deployment
A drug-eluting stent RESOLUTE SADE RX 3.0 X 18 #[id number] was inserted 
and inflated up to 12atm for 15seconds. Additional Inflation: 18atm 
for 12seconds.
 
Post Stent Deployment Balloon Dilation
A Balloon catheter Euphora NC RX 3.5 x 8 #[id number] was inserted and 
inflated up to 18atm for 15seconds. The proximal edge of the stent in 
the distal left main artery was postdilated with a 3.5 mm 
noncompliant balloon. The portion of the stent in the proximal LAD 
was postdilated with a 3.0 mm noncompliant balloon.
 
Final angiography reveals 0 % stenosis with RANCHO 3 flow.
 
Comments
1. Stent placed in the ostial/proximal left circumflex artery.
2. Stent placed in the distal left main extending into the proximal 
LAD.
3. A whisper wire was used cannulate the left circumflex artery 
through the stent strut in the left main artery.
4. Final kissing balloon dilatation was performed with a 2.5 mm 
semi-compliant balloon in the distal left main extending into the 
left circumflex artery and a 2.75 mm semi-compliant balloon in the 
distal left main extending into the LAD.
 
PCI Technique Lesion 3
Percutaneous Coronary Intervention was performed on the proximal left 
anterior descending artery segment.
 
Conclusion
1. Successful laser atherectomy and placement of a drug-eluting stent 
into the ostial/proximal left circumflex artery.
2. Successful laser atherectomy and placement of a drug-eluting stent 
into the distal left main extending into the proximal LAD.
 
 
[facility]
[address] [place]  [address]
Phone:  [phone]                    INVASIVE PROCEDURE REPORT     
_______________________________________________________________________________
 
Name:            MITCHELL,HUBERT DUANE         Room #:        213-P       [facility] [place]
.R.#:           [id number]          Account #:     [id number]  
Admission:       09/16/19         Attend Phys:   Bert Trent MD 
Discharge:                  Date of Birth: [birth year]  
                         Report #:      [id number]
        [id number]
_______________________________________________________________________________
3. Final kissing balloon dilatation performed at the distal left main 
bifurcation.
4. Recommend dual antiplatelet therapy and aggressive risk factor 
management.
 
 
 
 
 
 
 
 
 
 
 
 
 
 
 
 
 
 
 
 
 
 
 
 
 
 
 
 
 
 
 
 
 
 
 
 
 
 
 
 
  <ELECTRONICALLY SIGNED>
   By: Jw Haley MD               
  09/18/19 1712
D: 09/18/19 1712                           _____________________________________
T: 09/18/19 1712                           Jw Haley MD                 /INF

## 2019-09-19 VITALS — DIASTOLIC BLOOD PRESSURE: 57 MMHG | SYSTOLIC BLOOD PRESSURE: 89 MMHG

## 2019-09-19 VITALS — DIASTOLIC BLOOD PRESSURE: 57 MMHG | SYSTOLIC BLOOD PRESSURE: 131 MMHG

## 2019-09-19 VITALS — DIASTOLIC BLOOD PRESSURE: 65 MMHG | SYSTOLIC BLOOD PRESSURE: 90 MMHG

## 2019-09-19 VITALS — SYSTOLIC BLOOD PRESSURE: 127 MMHG | DIASTOLIC BLOOD PRESSURE: 73 MMHG

## 2019-09-19 VITALS — SYSTOLIC BLOOD PRESSURE: 102 MMHG | DIASTOLIC BLOOD PRESSURE: 58 MMHG

## 2019-09-19 VITALS — DIASTOLIC BLOOD PRESSURE: 60 MMHG | SYSTOLIC BLOOD PRESSURE: 88 MMHG

## 2019-09-19 LAB
ALBUMIN SERPL-MCNC: 3.4 G/DL (ref 3.4–5)
ALT SERPL-CCNC: 26 U/L (ref 30–65)
ANION GAP SERPL CALC-SCNC: 8 MMOL/L (ref 7–16)
AST SERPL-CCNC: 21 U/L (ref 15–37)
BILIRUB SERPL-MCNC: 0.7 MG/DL
BUN SERPL-MCNC: 18 MG/DL (ref 7–18)
CALCIUM SERPL-MCNC: 9.2 MG/DL (ref 8.5–10.1)
CHLORIDE SERPL-SCNC: 100 MMOL/L (ref 98–107)
CO2 SERPL-SCNC: 27 MMOL/L (ref 21–32)
CREAT SERPL-MCNC: 0.9 MG/DL (ref 0.7–1.3)
ERYTHROCYTE [DISTWIDTH] IN BLOOD BY AUTOMATED COUNT: 13.8 % (ref 10.5–14.5)
GLUCOSE SERPL-MCNC: 112 MG/DL (ref 74–106)
HCT VFR BLD CALC: 41.7 % (ref 42–52)
HGB BLD-MCNC: 14 GM/DL (ref 14–18)
MCH RBC QN AUTO: 30.9 PG (ref 26–34)
MCHC RBC AUTO-ENTMCNC: 33.6 G/DL (ref 28–37)
MCV RBC: 91.8 FL (ref 80–100)
PLATELET # BLD: 168 THOU/UL (ref 150–400)
POTASSIUM SERPL-SCNC: 4.4 MMOL/L (ref 3.5–5.1)
PROT SERPL-MCNC: 6.3 G/DL (ref 6.4–8.2)
RBC # BLD AUTO: 4.55 MIL/UL (ref 4.5–6)
SODIUM SERPL-SCNC: 135 MMOL/L (ref 136–145)
WBC # BLD AUTO: 7.8 THOU/UL (ref 4–11)

## 2019-09-19 NOTE — NUR
PATIENT ALERT AND ORIENTED X4, DENIES ANY CP OR DISCOMFORT ON ASSESMENT BENJAMIN
AM. SR AND VSS. RT GRION SITE IS D/C/I. UP WALKING.
PLAN TO D/C HOME TODAY.

## 2019-09-19 NOTE — EKG
[address] [employer]
[place]  [address]
Phone:  [phone]                    ELECTROCARDIOGRAM REPORT      
_______________________________________________________________________________
 
Name:       MARILOU MCCAIN DUANE         Room #:         213-P       ADM IN  
M.R.#:      [id number]     Account #:      [id number]  
Admission:  19    Attend Phys:    Bert Trent MD     
Discharge:              Date of Birth:  [birth year]  
                                                          Report #: [id number]
   [id number]
_______________________________________________________________________________
THIS REPORT FOR:   //name//                          
 
                          [facility]
                                       
Test Date:    2019               Test Time:    19:39:21
Pat Name:     MARILOU MCCAIN          Department:   
Patient ID:   [id number]            Room:         213 P
Gender:       M                        Technician:   MIKA GUILLAUME
:          1955               Requested By: Jw Haley
Order Number: [id number] MD:   Mark Rooney
                                 Measurements
Intervals                              Axis          
Rate:         79                       P:            39
HI:           172                      QRS:          -8
QRSD:         105                      T:            -7
QT:           393                                    
QTc:          451                                    
                           Interpretive Statements
Sinus rhythm
Ventricular premature complex
Inferior infarct, old
Compared to ECG 2019 14:33:46
Ventricular premature complex(es) now present
 
Electronically Signed On 2019 7:23:17 CDT by Mark Rooney
[email or web address]
 
 
 
 
 
 
 
 
 
 
 
 
 
 
 
 
 
  <ELECTRONICALLY SIGNED>
   By: Mark Rooney MD, [facility]   
  19     0723
D: 19                           _____________________________________
T: 19                           Mark Rooney MD, [facility]     /EPI

## 2019-09-19 NOTE — EKG
[address]  [address]
Phone:  [phone]                    ELECTROCARDIOGRAM REPORT      
_______________________________________________________________________________
 
Name:       ADÁN,HUBERT DUANE         Room #:         213-P       ADM IN  
M.R.#:      [id number]     Account #:      [id number]  
Admission:  19    Attend Phys:    Bert Trent MD     
Discharge:              Date of Birth:  [birth year]  
                                                          Report #: [id number]
   [id number]
_______________________________________________________________________________
THIS REPORT FOR:   //name//                          
 
                          [facility]
                                       
Test Date:    2019               Test Time:    07:42:16
Pat Name:     MARILOU MCCAIN          Department:   
Patient ID:   [id number]            Room:         213 P
Gender:       M                        Technician:   REMINGTON
:          1955               Requested By: Jw Haley
Order Number: [id number] MD:   Ken Hamptno
                                 Measurements
Intervals                              Axis          
Rate:         73                       P:            36
ME:           167                      QRS:          -7
QRSD:         106                      T:            2
QT:           409                                    
QTc:          451                                    
                           Interpretive Statements
Sinus rhythm
Inferior infarct, old
Compared to ECG 2019 19:39:21
Ventricular premature complex(es) no longer present
Myocardial infarct finding still present
 
Electronically Signed On 2019 16:40:09 CDT by Ken Hampton
[email or web address]
 
 
 
 
 
 
 
 
 
 
 
 
 
 
 
 
 
  <ELECTRONICALLY SIGNED>
   By: Ken Hampton MD        
  19     1640
D: 19 0742                           _____________________________________
T: 19 0742                           Ken Hampton MD          /EPI

## 2019-09-19 NOTE — NUR
Assumed pt care at 1900 with compliants of chest pain. Pt is alert and
oriented, no family at bedside. Pain med administered for reported chest pain.
Pain reassessed, pt stated pain reduced. Assessment completed and completed.
Scheduled meds administered to pt. Pt tolerated po intake. Denies any further
needs at this time.

## 2019-09-22 NOTE — HC
[facility]
[address]
[place]   [address]                     CONSULTATION                  
_______________________________________________________________________________
 
Name:       MITCHELL,HUBERT DUANE         Room #:         213-P       [facility] [place]  
..#:      [id number]                       Account #:      [id number]  
Admission:  09/16/19    Attend Phys:    Bert Trent MD     
Discharge:  09/19/19    Date of Birth:  [birth year]  
                                                          Report #: [id number]
                                                                    [id number]   
_______________________________________________________________________________
THIS REPORT FOR:   //name//                          
 
CC: Myles Trent
 
DATE OF SERVICE:  09/16/2019
 
 
We were asked by Dr. Busby to see the patient.  The patient is a 63-year-old
transferred here from [facility] today.  The patient has a history of
coronary artery disease and had a stent placement in the past.  The patient
states he had a myocardial infarct in 2018 and had stent placed when he was in
[place], [place] by Dr. Castle.  The patient also has a history of shortness
of breath and pulmonary fibrosis and claims he is on oxygen 3 liters, 24/7.  The
patient moved to this area and was seen by Dr. Cleveland who had scheduled an
elective stress test.  Prior to that being done, the patient began to have
unstable angina and cardiac catheterization was done this weekend.  Overall,
ejection fraction is reduced with a 35% ejection fraction with inferior
hypokinesis.  Right coronary is stented and there is no severe disease there,
but there is high-grade stenosis in the proximal LAD and circumflex systems with
a 50% left main lesion.
 
PAST MEDICAL HISTORY:  Also significant for hyperlipidemia, type 2 diabetes, and
chronic obstructive pulmonary disease.  The patient has had pulmonary function
testing in the past, but does not know specific results of these.
 
We note that the patient had a chest CT scan when he was at [place] and this
showed emphysematous changes as well as pulmonary fibrosis in patchy upper lobe
predominant pattern.
 
MEDICATIONS AT HOME:  Levothyroxine, albuterol, Symbicort, aspirin, vitamin D,
pantoprazole, vitamins, nitroglycerin, furosemide, prednisone, carvedilol,
rituximab, oxygen.
 
ALLERGIES:  PENICILLIN, the patient states he gets hives.  The patient states he
has had Keflex in the past without problem.
 
SOCIAL HISTORY:  Tobacco use.  The patient claims he is a former smoker.
 
REVIEW OF SYSTEMS:
CONSTITUTIONAL:  Denies fever or chills.
EYES:  Denies visual change.  The patient wears glasses.
RESPIRATORY:  Shortness of breath on exertion.  The patient uses oxygen 24/7.
CARDIAC:  Unstable angina this weekend, brought the patient to the hospital.
GASTROINTESTINAL:  No nausea, vomiting, abdominal pain.
GENITOURINARY:  No burning, frequency, or blood.
 
 
 
[address]   [address]                     CONSULTATION                  
_______________________________________________________________________________
 
Name:       MARILOU MCCAIN DUANE         Room #:         213-P       [facility] [place]  
[facility].#:      [id number]                       Account #:      [id number]  
Admission:  09/16/19    Attend Phys:    Bert Trent MD     
Discharge:  09/19/19    Date of Birth:  [birth year]  
                                                          Report #: [id number]
                                                                    [id number]   
_______________________________________________________________________________
MUSCULOSKELETAL:  No bone or joint pain.
SKIN:  No rash or infection.
NEUROLOGIC:  No motor or sensory dysfunction.
 
PHYSICAL EXAMINATION:
VITAL SIGNS:  Blood pressure 130/70, heart rate 74, respiratory rate 18.
HEENT:  No scleral icterus, no arcus.  The patient is wearing oxygen.
NECK:  No mass, no bruit.
CHEST:  No rales or rhonchi, has some fork sounds.
HEART:  Rhythm regular, no murmur audible.
ABDOMEN:  Soft, no mass.
EXTREMITIES:  2+ right femoral pulse.  I do not feel a left femoral pulse.  No
obvious saphenous vein problems.
SKIN:  No rash or infection.
NEUROLOGIC:  No obvious motor or sensory dysfunction.
 
IMPRESSION:  The patient has coronary artery disease in the setting of a history
of pulmonary fibrosis and chronic oxygen use.  I have taken the liberty of
ordering full pulmonary function tests.  If we feel that the patient is at
higher risk for surgery related to his lung issues, then I may ask Cardiology to
offer an opinion regarding feasibility of atherectomy and angioplasty.  Risks
and details of surgery were discussed in general with the patient in the light
of the pulmonary disease.
 
Thank you for the consult.
 
 
 
 
 
 
 
 
 
 
 
 
 
 
 
 
 
 
 
  <ELECTRONICALLY SIGNED>
   By: Laron Hernández MD            
  09/22/19     0932
D: 09/16/19 1404                           _____________________________________
T: 09/16/19 2259                           Laron Hernández MD              /nt

## 2019-09-24 NOTE — PFR/MVV
[facility]
[address]
[place]  [address]                      PULMONARY FUNCTION MVV/REPORT 
_________________________________________________________________________________________
 
Name:       MITCHELL,HUBERT DUANE          Room #:         213-P       [facility] [place]  
[facility]#:      [id number]      Account #:      [id number]  
Admission:  09/16/19     Attend Phys:    Bert Trent MD             
Discharge:  09/19/19     Date of Birth:  [birth year]  
                                                           Report #: [id number]
_________________________________________________________________________________________
THIS REPORT FOR:   //name//                          
 
>> SPIROMETRY:     (BTPS)
 
       Height: 71.0  in        cm
       Weight: 198   lbs       kg           Exam Date: 09/16/19
 
 
 
                                            PRE-RX                POST-RX
                              PRED      BEST     %PRED         BEST     %PRED      %CHG
FVC          LITERS        . 4.72    . 3.07    . 65         . 2.97    . 63      . -3    
FEV1         LITERS        . 3.29    . 2.00    . 61         . 2.05    . 62      . 2     
FEV1/FVC      %            . 70      . 65      . 93         . 69      . 99      . 5     
HFA08-34%    L/Sec         . 3.06    . 0.96    . 31         . 1.25    . 41      . 31    
PEF          L/SEC         . 8.78    . 6.19    . 70         . 4.10    . 47      . -34   
FEF50/FIF50  UNITLESS      .         . 0.98    .            . 0.98    .         . 0     
 
MVV           L/Min        . 141     . 52      . 37    
f             1/Min        .         . 125     .       
 
>> LUNG VOLUMES:   (BTPS)
 
 
                                            PRE-RX                POST-RX
                            PRED      AVG      %PRED         AVG      %PRED       %CHG
VC        Liters           . 4.72    . 3.07    . 65         .         .         .       
TLC       Liters           . 6.87    . 4.10    . 60         .         .         .       
RV        Liters           . 2.47    . 1.03    . 42         .         .         .       
RV/TLC    %                . 38      . 25      . 66         .         .         .       
FRC PL    Liters           . 3.76    . 1.78    . 47         .         .         .       
FRC N2    Liters           .         .         .            .         .         .       
ERV       Liters           . 1.59    . 0.75    . 47         .         .         .       
IC        Liters           . 3.18    . 2.37    . 74         .         .         .       
 
>> DIFFUSION:
 
DLCO      ml/Min/mmHg      . 24.5    . 6.0     . 24         .         .         .       
DL [place]   ml/Min/mmHg      . 24.5    . 6.0     . 24         .         .         .       
DLCO/VA   ml/Min/mmHg      . 3.77    . 2.05    . 54         .         .         .       
VA        Liters           . 7.30    . 2.93    . 40         .         .         .       
 
COMMENTS:                                                                            
COMMENTS:                                                                            
 
>> RESISTANCE:
 
 
 
 
[facility]
[address]
[place]  [address]                      PULMONARY FUNCTION MVV/REPORT 
_________________________________________________________________________________________
 
Name:       MARILOU MCCAIN DUANE          Room #:         213-P       [facility]  
M.R.#:      [id number]      Account #:      [id number]  
Admission:  09/16/19     Attend Phys:    Bert Trent MD             
Discharge:  09/19/19     Date of Birth:  [birth year]  
                                                           Report #: [id number]
_________________________________________________________________________________________
                                                 PRE-RX
                                        PRED      AVG      %PRED
Raw Total      cmH20/L/Sec           .         . 3.30    .       
Raw Insp       cmH20/L/Sec           .         . 2.14    .       
Raw Exp        cmH20/L/Sec           .         . 2.91    .       
 
Raw            cmH20/L/Sec           . 1.18    . 2.59    . 220   
 
Gaw            L/Sec/cmH20           . 0.902   . 0.386   . 43    
sRaw           cmH20 Sec             . 4.43    . 7.51    . 169   
sGaw           l/cmH20 Sec           . 0.226   . 0.133   . 59    
Vtq            Liters                .         . 2.89    .       
------------------------------------
# = OUTSIDE 95% CONFIDENCE INTERVAL
CALIBRATION: PRED: 3.00  ACTUAL: EXP 3.01  INSP 3.02
IPS-OL10-06  IPS-OHIO-05 N-1804-4
 
 
>> INTERPRETATION/IMPRESSION:
 
CC: Myles Trent
 
DESCRIPTION OF PROCEDURE:  Spirometric examination revealed moderate airflow
obstruction.  There was no significant bronchodilator response.  Lung volumes
are mildly reduced with a total lung capacity measuring 60% of predicted. 
Diffusion capacity is markedly reduced measuring 24% of predicted, remains
reduced at 54% of predicted when corrected for alveolar volume.  Flow volume
loop is consistent with mixed obstructive and restrictive ventilatory defect.
 
IMPRESSION:  Mildly severe mixed obstructive and restrictive ventilatory defect.
 Clinical correlation is recommended.
 
 
 
 
 
 
 
 
 
 
 
 
 
  <ELECTRONICALLY SIGNED>
   By: Juma Zhang MD                  
  09/24/19     1845
D: 09/23/19                                _____________________________________
T: 09/23/19                                Juma Zhang MD                    /nt

## 2020-03-02 ENCOUNTER — APPOINTMENT (RX ONLY)
Dept: URBAN - METROPOLITAN AREA CLINIC 142 | Facility: CLINIC | Age: 65
Setting detail: DERMATOLOGY
End: 2020-03-02

## 2020-03-02 DIAGNOSIS — L82.1 OTHER SEBORRHEIC KERATOSIS: ICD-10-CM

## 2020-03-02 DIAGNOSIS — D18.0 HEMANGIOMA: ICD-10-CM

## 2020-03-02 DIAGNOSIS — L82.0 INFLAMED SEBORRHEIC KERATOSIS: ICD-10-CM

## 2020-03-02 DIAGNOSIS — D22 MELANOCYTIC NEVI: ICD-10-CM

## 2020-03-02 DIAGNOSIS — L81.4 OTHER MELANIN HYPERPIGMENTATION: ICD-10-CM

## 2020-03-02 PROBLEM — E05.90 THYROTOXICOSIS, UNSPECIFIED WITHOUT THYROTOXIC CRISIS OR STORM: Status: ACTIVE | Noted: 2020-03-02

## 2020-03-02 PROBLEM — D22.5 MELANOCYTIC NEVI OF TRUNK: Status: ACTIVE | Noted: 2020-03-02

## 2020-03-02 PROBLEM — J45.909 UNSPECIFIED ASTHMA, UNCOMPLICATED: Status: ACTIVE | Noted: 2020-03-02

## 2020-03-02 PROBLEM — D22.62 MELANOCYTIC NEVI OF LEFT UPPER LIMB, INCLUDING SHOULDER: Status: ACTIVE | Noted: 2020-03-02

## 2020-03-02 PROBLEM — D18.01 HEMANGIOMA OF SKIN AND SUBCUTANEOUS TISSUE: Status: ACTIVE | Noted: 2020-03-02

## 2020-03-02 PROBLEM — D22.61 MELANOCYTIC NEVI OF RIGHT UPPER LIMB, INCLUDING SHOULDER: Status: ACTIVE | Noted: 2020-03-02

## 2020-03-02 PROBLEM — I10 ESSENTIAL (PRIMARY) HYPERTENSION: Status: ACTIVE | Noted: 2020-03-02

## 2020-03-02 PROCEDURE — 99203 OFFICE O/P NEW LOW 30 MIN: CPT | Mod: 25

## 2020-03-02 PROCEDURE — 17110 DESTRUCTION B9 LES UP TO 14: CPT

## 2020-03-02 PROCEDURE — ? LIQUID NITROGEN

## 2020-03-02 PROCEDURE — ? COUNSELING

## 2020-03-02 ASSESSMENT — LOCATION SIMPLE DESCRIPTION DERM
LOCATION SIMPLE: LEFT UPPER ARM
LOCATION SIMPLE: RIGHT UPPER ARM
LOCATION SIMPLE: CHEST
LOCATION SIMPLE: RIGHT UPPER BACK

## 2020-03-02 ASSESSMENT — LOCATION DETAILED DESCRIPTION DERM
LOCATION DETAILED: STERNUM
LOCATION DETAILED: LEFT MEDIAL SUPERIOR CHEST
LOCATION DETAILED: LEFT PROXIMAL POSTERIOR UPPER ARM
LOCATION DETAILED: RIGHT SUPERIOR UPPER BACK
LOCATION DETAILED: RIGHT PROXIMAL POSTERIOR UPPER ARM

## 2020-03-02 ASSESSMENT — LOCATION ZONE DERM
LOCATION ZONE: ARM
LOCATION ZONE: TRUNK

## 2020-03-02 NOTE — PROCEDURE: LIQUID NITROGEN
Medical Necessity Information: It is in your best interest to select a reason for this procedure from the list below. All of these items fulfill various CMS LCD requirements except the new and changing color options.
Detail Level: Simple
Post-Care Instructions: I reviewed with the patient in detail post-care instructions. Patient is to wear sunprotection, and avoid picking at any of the treated lesions. Pt may apply Vaseline to crusted or scabbing areas.
Total Number Of Lesions Treated: 3
Render In Bullet Format When Appropriate: No
Render Post Care In The Note?: yes
Medical Necessity Clause: This procedure was medically necessary because the lesions that were treated were:
Duration Of Freeze Thaw-Cycle (Seconds): 15
Number Of Freeze-Thaw Cycles: 1 freeze-thaw cycle
Consent: The patient's consent was obtained including but not limited to risks of crusting, scabbing, blistering, scarring, darker or lighter pigmentary change, recurrence, incomplete removal and infection.

## 2020-06-03 ENCOUNTER — HOSPITAL ENCOUNTER (OUTPATIENT)
Dept: HOSPITAL 96 - M.LAB | Age: 65
End: 2020-06-03
Attending: NURSE PRACTITIONER
Payer: MEDICARE

## 2020-06-03 DIAGNOSIS — I25.5: Primary | ICD-10-CM

## 2020-06-03 LAB
ABSOLUTE BASOPHILS: 0.2 THOU/UL (ref 0–0.2)
ABSOLUTE EOSINOPHILS: 0.2 THOU/UL (ref 0–0.7)
ABSOLUTE MONOCYTES: 0.8 THOU/UL (ref 0–1.2)
ALBUMIN SERPL-MCNC: 3.8 G/DL (ref 3.4–5)
ALP SERPL-CCNC: 82 U/L (ref 46–116)
ALT SERPL-CCNC: 30 U/L (ref 30–65)
ANION GAP SERPL CALC-SCNC: 6 MMOL/L (ref 7–16)
AST SERPL-CCNC: 17 U/L (ref 15–37)
BASOPHILS NFR BLD AUTO: 1.5 %
BILIRUB SERPL-MCNC: 0.6 MG/DL
BUN SERPL-MCNC: 26 MG/DL (ref 7–18)
CALCIUM SERPL-MCNC: 9.4 MG/DL (ref 8.5–10.1)
CHLORIDE SERPL-SCNC: 105 MMOL/L (ref 98–107)
CO2 SERPL-SCNC: 28 MMOL/L (ref 21–32)
CREAT SERPL-MCNC: 1.3 MG/DL (ref 0.6–1.3)
EOSINOPHIL NFR BLD: 2.2 %
GLUCOSE SERPL-MCNC: 99 MG/DL (ref 70–99)
GRANULOCYTES NFR BLD MANUAL: 71.2 %
HCT VFR BLD CALC: 44.5 % (ref 42–52)
HGB BLD-MCNC: 15.7 GM/DL (ref 14–18)
LYMPHOCYTES # BLD: 1.9 THOU/UL (ref 0.8–5.3)
LYMPHOCYTES NFR BLD AUTO: 17.7 %
MCH RBC QN AUTO: 32.5 PG (ref 26–34)
MCHC RBC AUTO-ENTMCNC: 35.3 G/DL (ref 28–37)
MCV RBC: 91.9 FL (ref 80–100)
MONOCYTES NFR BLD: 7.4 %
MPV: 8.2 FL. (ref 7.2–11.1)
NEUTROPHILS # BLD: 7.6 THOU/UL (ref 1.6–8.1)
NT-PRO BRAIN NAT PEPTIDE: 388 PG/ML (ref ?–300)
NUCLEATED RBCS: 0 /100WBC
PLATELET COUNT*: 184 THOU/UL (ref 150–400)
POTASSIUM SERPL-SCNC: 4.2 MMOL/L (ref 3.5–5.1)
PROT SERPL-MCNC: 7.2 G/DL (ref 6.4–8.2)
RBC # BLD AUTO: 4.84 MIL/UL (ref 4.5–6)
RDW-CV: 13.6 % (ref 10.5–14.5)
SODIUM SERPL-SCNC: 139 MMOL/L (ref 136–145)
WBC # BLD AUTO: 10.7 THOU/UL (ref 4–11)

## 2020-06-25 ENCOUNTER — HOSPITAL ENCOUNTER (OUTPATIENT)
Dept: HOSPITAL 96 - M.CL | Age: 65
Setting detail: OBSERVATION
LOS: 1 days | Discharge: HOME | End: 2020-06-26
Attending: INTERNAL MEDICINE | Admitting: INTERNAL MEDICINE
Payer: MEDICARE

## 2020-06-25 VITALS — DIASTOLIC BLOOD PRESSURE: 63 MMHG | SYSTOLIC BLOOD PRESSURE: 105 MMHG

## 2020-06-25 VITALS — DIASTOLIC BLOOD PRESSURE: 60 MMHG | SYSTOLIC BLOOD PRESSURE: 108 MMHG

## 2020-06-25 VITALS — SYSTOLIC BLOOD PRESSURE: 110 MMHG | DIASTOLIC BLOOD PRESSURE: 60 MMHG

## 2020-06-25 VITALS — DIASTOLIC BLOOD PRESSURE: 47 MMHG | SYSTOLIC BLOOD PRESSURE: 75 MMHG

## 2020-06-25 VITALS — DIASTOLIC BLOOD PRESSURE: 62 MMHG | SYSTOLIC BLOOD PRESSURE: 98 MMHG

## 2020-06-25 VITALS — DIASTOLIC BLOOD PRESSURE: 59 MMHG | SYSTOLIC BLOOD PRESSURE: 100 MMHG

## 2020-06-25 VITALS — BODY MASS INDEX: 28.84 KG/M2 | WEIGHT: 206 LBS | HEIGHT: 70.98 IN

## 2020-06-25 VITALS — SYSTOLIC BLOOD PRESSURE: 105 MMHG | DIASTOLIC BLOOD PRESSURE: 65 MMHG

## 2020-06-25 VITALS — DIASTOLIC BLOOD PRESSURE: 56 MMHG | SYSTOLIC BLOOD PRESSURE: 79 MMHG

## 2020-06-25 DIAGNOSIS — I95.9: ICD-10-CM

## 2020-06-25 DIAGNOSIS — I25.2: ICD-10-CM

## 2020-06-25 DIAGNOSIS — E78.00: ICD-10-CM

## 2020-06-25 DIAGNOSIS — I25.5: ICD-10-CM

## 2020-06-25 DIAGNOSIS — I25.110: Primary | ICD-10-CM

## 2020-06-25 DIAGNOSIS — I10: ICD-10-CM

## 2020-06-25 LAB
ALBUMIN SERPL-MCNC: 3.8 G/DL (ref 3.4–5)
ALP SERPL-CCNC: 84 U/L (ref 46–116)
ALT SERPL-CCNC: 27 U/L (ref 30–65)
ANION GAP SERPL CALC-SCNC: 8 MMOL/L (ref 7–16)
APTT BLD: 28.2 SECONDS (ref 25–31.3)
AST SERPL-CCNC: 23 U/L (ref 15–37)
BILIRUB SERPL-MCNC: 0.8 MG/DL
BUN SERPL-MCNC: 17 MG/DL (ref 7–18)
CALCIUM SERPL-MCNC: 8.9 MG/DL (ref 8.5–10.1)
CHLORIDE SERPL-SCNC: 105 MMOL/L (ref 98–107)
CHOLEST SERPL-MCNC: 127 MG/DL (ref ?–200)
CO2 SERPL-SCNC: 25 MMOL/L (ref 21–32)
CREAT SERPL-MCNC: 1 MG/DL (ref 0.6–1.3)
GLUCOSE SERPL-MCNC: 94 MG/DL (ref 70–99)
HCT VFR BLD CALC: 44.8 % (ref 42–52)
HDLC SERPL-MCNC: 35 MG/DL (ref 40–?)
HGB BLD-MCNC: 15.7 GM/DL (ref 14–18)
INR PPP: 1.3
LDLC SERPL-MCNC: 75 MG/DL (ref ?–100)
MCH RBC QN AUTO: 32.4 PG (ref 26–34)
MCHC RBC AUTO-ENTMCNC: 34.9 G/DL (ref 28–37)
MCV RBC: 92.8 FL (ref 80–100)
MPV: 7.8 FL. (ref 7.2–11.1)
PLATELET COUNT*: 177 THOU/UL (ref 150–400)
POTASSIUM SERPL-SCNC: 4.8 MMOL/L (ref 3.5–5.1)
PROT SERPL-MCNC: 7 G/DL (ref 6.4–8.2)
PROTHROMBIN TIME: 13.3 SECONDS (ref 9.2–11.5)
RBC # BLD AUTO: 4.83 MIL/UL (ref 4.5–6)
RDW-CV: 13.7 % (ref 10.5–14.5)
SODIUM SERPL-SCNC: 138 MMOL/L (ref 136–145)
TC:HDL: 3.6 RATIO
TRIGL SERPL-MCNC: 87 MG/DL (ref ?–150)
VLDLC SERPL CALC-MCNC: 17 MG/DL (ref ?–40)
WBC # BLD AUTO: 7.5 THOU/UL (ref 4–11)

## 2020-06-25 NOTE — H
[address]  [address]                    HISTORY AND PHYSICAL          
_______________________________________________________________________________
 
Name:       MARILOU MCCAIN              Room:           [address] Radha APPIAH#:  [id number]      Account #:      [id number]  
Admission:  06/25/20     Attend Phys:    Hank Cleveland MD
Discharge:  06/26/20     Date of Birth:  [birth year]  
         Report #: [id number]
                                                                                
_______________________________________________________________________________
THIS REPORT FOR:  //name//                      
 
cc:  Myles Schroeder Bradley L. DO                                                   ~
THIS REPORT FOR:  //name//                      
 
Please refer to the History and Physical performed in the physician's office.
 
 
 
 
 
 
 
 
 
 
 
 
 
 
 
 
 
 
 
 
 
 
 
 
 
 
 
 
 
 
 
 
 
 
 
 
 
                       
                                        By:                                
                 
D: 06/25/20     _______________________________________
T: 06/30/20 1136Medical Records Staff [facility]       /AL

## 2020-06-25 NOTE — EKG
[place] [address]
Phone:  [phone]                     ELECTROCARDIOGRAM REPORT      
_______________________________________________________________________________
 
Name:         MARILOU MCCAIN             Room:                     REG CLI
M.R.#:    [id number]     Account #:     [id number]  
Admission:    20    Attend Phys:   Hank Cleveland,
Discharge:                Date of Birth: [birth year]  
Date of Service: 20 1334  Report #:      [id number]
        [id number]
_______________________________________________________________________________
THIS REPORT FOR:  //name//                      
 
                          [facility]
                                       
Test Date:    2020               Test Time:    13:34:22
Pat Name:     MARILOU MCCAIN          Department:   
Patient ID:   [id number]            Room:          
Gender:                               Technician:   
:          1955               Requested By: Hank Cleveland
Order Number: [id number]    Reading MD:   Laron Busby
                                 Measurements
Intervals                              Axis          
Rate:         57                       P:            31
MD:           167                      QRS:          -3
QRSD:         119                      T:            18
QT:           452                                    
QTc:          440                                    
                           Interpretive Statements
Sinus rhythm
Incomplete left bundle branch block
Compared to ECG 2019 22:51:33
Ventricular premature complex(es) no longer present
Inferior scar cannot cannot be excluded as before
Electronically Signed On 2020 14:17:57 CDT by Laron Busby
[email or web address]
 
 
 
 
 
 
 
 
 
 
 
 
 
 
 
 
 
 
 
  <ELECTRONICALLY SIGNED>
                                           By: Laron Busby MD, [facility]     
  20     1417
D: 20 1334   _____________________________________
T: 20 1334   Laron Busby MD, [facility]       /EPI

## 2020-06-25 NOTE — EKG
[place] [address]
Phone:  [phone]                     ELECTROCARDIOGRAM REPORT      
_______________________________________________________________________________
 
Name:         MARILOU MCCAIN             Room:          [address]#:    [id number]     Account #:     [id number]  
Admission:    20    Attend Phys:   Hank Cleveland,
Discharge:                Date of Birth: [birth year]  
Date of Service: 20 1630  Report #:      [id number]
        [id number]
_______________________________________________________________________________
THIS REPORT FOR:  //name//                      
 
                          [facility]
                                       
Test Date:    2020               Test Time:    16:30:46
Pat Name:     MARILOU MCCAIN          Department:   
Patient ID:   [id number]            Room:         [facility]
Gender:       M                        Technician:   TRU
:          1955               Requested By: Hank Cleveland
Order Number: [id number]    Reading MD:   Hank Cleveland
                                 Measurements
Intervals                              Axis          
Rate:         53                       P:            37
UT:           178                      QRS:          -7
QRSD:         107                      T:            -19
QT:           471                                    
QTc:          443                                    
                           Interpretive Statements
Sinus rhythm
Borderline low voltage, extremity leads
Compared to ECG 2020 13:34:22
Left bundle-branch block no longer present
Electronically Signed On 2020 18:21:59 CDT by Hank Cleveland
[email or web address]
 
 
 
 
 
 
 
 
 
 
 
 
 
 
 
 
 
 
 
 
  <ELECTRONICALLY SIGNED>
                                           By: Hank Cleveland MD, FACC   
  20     1821
D: 20 1630   _____________________________________
T: 20 1630   Hank Cleveland MD, [facility]     /EPI

## 2020-06-26 VITALS — SYSTOLIC BLOOD PRESSURE: 86 MMHG | DIASTOLIC BLOOD PRESSURE: 53 MMHG

## 2020-06-26 VITALS — SYSTOLIC BLOOD PRESSURE: 88 MMHG | DIASTOLIC BLOOD PRESSURE: 49 MMHG

## 2020-06-26 VITALS — DIASTOLIC BLOOD PRESSURE: 53 MMHG | SYSTOLIC BLOOD PRESSURE: 86 MMHG

## 2020-06-26 VITALS — DIASTOLIC BLOOD PRESSURE: 54 MMHG | SYSTOLIC BLOOD PRESSURE: 91 MMHG

## 2020-06-26 NOTE — NUR
ASSUMED CARE OF PT THIS AM AROUND 0715- CARDIAC MONITOR IN PLACE, TRACING SR-
UPON ASSESSMENT PT NOTED TO BE RESTING IN BED, WATCHING TV- PT A&O X4- CONT OF
BOWEL AND BLADDER-SBA WITH CANE FOR TRANSFERS-BP NOTED TO BE LOW THIS AM AT
86/53 AND NOT SYMPTOMATIC,  AWARE NOTED TO BE PT NORM- LCTA, RESP
EVEN AND UN-LABORED- ABD SOFT/ROUND/NON-TENDER, BS X4 QUDAS- LAST BM REPORTED
6/25/20- IV NOTED TO RIGHT HAND INTACT AND SL- GOOD PO INTAKE NOTED THIS AM
WITH BREAKFAST- RIGHT GROIN SIGHT C/D/I WITH NO HEMATOMA NOTED- CALL LIGHT AND
PERSONAL BELONGINGS WITH IN REACH- ALL NEEDS MET AT THIS TIME-WCTM

## 2020-06-26 NOTE — EKG
[place] [address]
Phone:  [phone]                     ELECTROCARDIOGRAM REPORT      
_______________________________________________________________________________
 
Name:         MARILOU MCCAIN             Room:          [address]#:    [id number]     Account #:     [id number]  
Admission:    20    Attend Phys:   Hank Cleveland,
Discharge:    20    Date of Birth: [birth year]  
Date of Service: 20  Report #:      [id number]
        [id number]
_______________________________________________________________________________
THIS REPORT FOR:  //name//                      
 
                          [facility]
                                       
Test Date:    2020               Test Time:    08:21:19
Pat Name:     MARILOU MCCAIN          Department:   
Patient ID:   [id number]            Room:         [facility]
Gender:       M                        Technician:   
:          1955               Requested By: Hank Cleveland
Order Number: [id number]    Bar MD:   Laron Busby
                                 Measurements
Intervals                              Axis          
Rate:         74                       P:            42
NE:           172                      QRS:          -3
QRSD:         105                      T:            10
QT:           408                                    
QTc:          453                                    
                           Interpretive Statements
Sinus rhythm
Borderline low voltage, extremity lead
possible inferior scar
Compared to ECG 2020 16:30:46
No significant changes
Electronically Signed On 2020 15:40:11 CDT by Laron Busby
[email or web address]
 
 
 
 
 
 
 
 
 
 
 
 
 
 
 
 
 
 
 
  <ELECTRONICALLY SIGNED>
                                           By: Laron Busby MD, [facility]     
  20     1540
D: 2021   _____________________________________
T: 2021   Laron Busby MD, [facility]       /EPI

## 2020-06-26 NOTE — NUR
ASSUMED PT CARE AT 1910. NURSING ASSESSMENT COMPLETED AT START OF SHIFT. SR ON
CARDIAC MONITOR. RIGHT GROIN CATH SITE CLEAN,DRY, INTACT. HOURLY ROUNDING
COMPLETED. PT VOICED NO CONCERNS THIS SHIFT. FALL PRECAUTION IN PLACE. CALL
LIGHT WITHIN REACH.

## 2020-06-27 NOTE — CARD
[facility] 
[address] [place]  [address]                    CARDIAC CATH REPORT           
_______________________________________________________________________________
 
Name:       MARILOU MCCAIN              Room:           [address] Radha APPIAH#:  [id number]      Account #:      [id number]  
Admission:  06/25/20     Attend Phys:    Hank Cleveland MD
Discharge:  06/26/20     Date of Birth:  [birth year]  
         Report #: [id number]
                                                                     [id number]
_______________________________________________________________________________
THIS REPORT FOR:  //name//                      
 
cc:  Myles Schroeder DO            
     Myles Schroeder DO                                                   ~
 
--------------- APPROVED REPORT --------------
 
 
Study performed:  06/25/2020 13:38:46
 
Patient Details
Patient Status: Out-Patient                  Room #: 
The patient is a 64 year-old male
 
Event Personnel
Hank Cleveland  Cardiologist, Laron Busby  Interventional 
Cardiologist, Raquel Nice RN Circulator, Byron Qureshi Scrub, 
Rachel Gates RTR Monitor
 
Procedures Performed
Art Access - R femoral artery,  Left Heart Cath w/or w/o Coronaries 
LHC, TRAVIS Place w/wo Plasty Single CIRC , Hemostasis w/ 
Mynx
 
Indication
Unstable angina 
 
Risk Factors
Hypercholesterolemia, Hypertension
 
Previous Procedures/Diagnoses
Previous PCI, Previous MI
 
Admission/Lab Medications/Medications given during procedure
Angiomax IV 14 ml, Angiomax Drip IV 32.76 ml per hr, Effient PO 60 
mg
 
Procedure Narrative
The patient was brought electively to the Cardiac Catheterization 
Laboratory and was prepped and draped in a sterile manner. The right 
femoral was infiltrated with 1% Lidocaine subcutaneous anesthesia. A 
6F [place] sheath was inserted into the right femoral artery. 
Coronary angiography was performed using coronary diagnostic 
catheters. The right coronary system was accessed and visualized with 
a 6F JR4 catheter. The left coronary system was accessed and 
visualized with a 6F JL4 catheter. The left ventricle was accessed 
 
 
 
[place] [address]                    CARDIAC CATH REPORT           
_______________________________________________________________________________
 
Name:       MARILOU MCCAIN              Room:           [address]#:  [id number]      Account #:      [id number]  
Admission:  06/25/20     Attend Phys:    Hank Cleveland MD
Discharge:  06/26/20     Date of Birth:  [birth year]  
         Report #: [id number]
                                                                     [id number]
_______________________________________________________________________________
and visualized with a 6F Pigtail catheter. Left ventricular/Aortic 
Valve gradient assessed via catheter pullback. Left ventriculogram 
was performed in BROWN projection. Pre-demployment femoral angiogram 
was performed . Closure device was deployed with a 6 Fr Mynx. The 
patient tolerated the procedure well and there were no complications 
associated with the procedure. There was no hematoma.
 
Intraoperative Conscious Sedation
Sedation start time:  14:29           Case end Time:  
15:15    
Fentanyl  25 mcg    Versed  1 mg  
 
Fluoro Time:    12.5 minutes     
Dose:     DAP 013360 cGycm2  1882 mGy  
Contrast Type and Amount:  Visipaque 240 ml    
 
Diagnostic Cath
Left Main Widely patent distal left main coronary stent with 0% 
narrowing
LAD  Widely patent proximal LAD stent with 30% mid vessel 
narrowing
Circumflex Widely patent ostial circumflex stent with 80% narrowing 
at the distal margin of the stent
Right Coronary Large dominant right coronary artery with 40% mid and 
distal narrowings
 
Left Ventriculography
The left ventricle is normal in size with contractility. The left 
ventricular ejection fraction is estimated to be 45%. Left 
ventricular wall motion abnormalities are present. There is no mitral 
insufficiency. Apical hypokinesis is noted
 
Hemodynamics
The aortic pressure is 84/42 mmHg with a mean of 60 mmHg. The left 
ventricular pressure is 87/0 mmHg with a mean of mmHg. The left 
ventricular end diastolic pressure is 7 mmHg. 
 
PCI Technique Lesion
Anticoagulation was achieved with Angiomax. Patient was preloaded 
with Angiomax IV 14 ml. Percutaneous coronary intervention was 
performed on the proximal circumflex artery segment. The lesion 
stenosis prior to intervention was 80% with RANCHO 3 flow. A 6F XB 4.0 
Guide Catheter was used to engage the lm ostium. A BMW 190cm 
Interventional Guidewire was used to cross the lesion.
 
BALLOON DILATION
 
 
 
[place] [address]                    CARDIAC CATH REPORT           
_______________________________________________________________________________
 
Name:       MARILOU MCCAIN              Room:           [address]#:  [id number]      Account #:      [id number]  
Admission:  06/25/20     Attend Phys:    Hank Cleveland MD
Discharge:  06/26/20     Date of Birth:  [birth year]  
         Report #: [id number]
                                                                     [id number]
_______________________________________________________________________________
A Balloon catheter Euphora SC 2.25x12 was inserted and inflated up to 
7.00atm for 12seconds. Additional Inflation: 10.00atm for 
9seconds.
 
STENT DEPLOYMENT
A drug-eluting stent Yayo RX Stent  2.49S60pi was inserted and 
inflated up to 12.00atm for 11seconds. Additional Inflation: 15.00atm 
for 6seconds.
 
POST STENT DEPLOYMENT BALLOON DILATION
A Balloon catheter NC Euphora 2.5x12 was inserted and inflated up to 
16.00atm for 11seconds. Additional Inflation: 18.00atm for 11seconds. 
Additional Inflation: 22.00atm for 12seconds.
 
Final angiography reveals 0 % stenosis with RANCHO 3 
flow.
 
Conclusion
1.  Coronary artery disease characterized by the following:
 
A widely patent distal left main coronary stent with 0% narrowing
 
B widely patent ostial proximal LAD stent with 30% mid vessel 
narrowing
 
C widely patent ostial proximal circumflex stent with 80% narrowing 
at the distal margin of that stent
 
D large dominant right coronary artery with 40% mid and distal 
narrowings
 
2.  Mild impairment in global LV function, estimated ejection 
fraction 45% with apical hypokinesis
 
3.  Mild systemic hypotension throughout the procedure with normal 
left ventricular end-diastolic pressure
 
4.  Successful PCI with deployment of a drug-eluting stent at the 
site of 80% proximal circumflex stenosis with 0% residual narrowing 
and RANCHO-3 flow to the distal vessel 
 
Recommendations
Cardiac Risk Reduction Program
Aggressive Medical Therapy
 
Medications Administered
 
 
 
[place] [address]                    CARDIAC CATH REPORT           
_______________________________________________________________________________
 
Name:       MARILOU MCCAIN              Room:           [address] Radha APPIAH#:  [id number]      Account #:      [id number]  
Admission:  06/25/20     Attend Phys:    Hank Cleveland MD
Discharge:  06/26/20     Date of Birth:  [birth year]  
         Report #: [id number]
                                                                     [id number]
_______________________________________________________________________________
Aspirin (any) 
Prasugrel 
 
Diagnostic Cath Approved by: Hank Cleveland MD        Date/Time: 
06/27/2020 10:59:01
 
 
 
 
 
 
 
 
 
 
 
 
 
 
 
 
 
 
 
 
 
 
 
 
 
 
 
 
 
 
 
 
 
 
 
 
 
 
 
<ELECTRONICALLY SIGNED>
                                        By:  Laron Busby MD, FACC     
06/27/20     1101
D: 06/27/20 1101_______________________________________
T: 06/27/20 1101Joelif Busby MD, FACC        /INF

## 2020-06-29 NOTE — D
[facility] 
[address] [place]  [address]                    DISCHARGE SUMMARY             
_______________________________________________________________________________
 
Name:       MARILOU MCCAIN              Room:           [address] Radha APPIAH#:  [id number]      Account #:      [id number]  
Admission:  06/25/20     Attend Phys:    Hank Cleveland MD
Discharge:  06/26/20     Date of Birth:  [birth year]  
         Report #: [id number]
                                                                     [id number]  
_______________________________________________________________________________
THIS REPORT FOR:  //name//                      
 
cc:  Myles Schroeder Bradley L. DO                                                   
THIS REPORT FOR:  //name//                      
 
CC: Myles Schroeder DO
    Hank Cleveland
 
DISCHARGE DIAGNOSES:
1.  Unstable angina.
2.  Coronary artery disease.
3.  Ischemic cardiomyopathy.
4.  Hypotension.
 
PROCEDURES DURING HOSPITALIZATION:
1.  Coronary angiography.
2.  Left heart catheterization.
3.  Left ventriculography.
4.  Percutaneous coronary intervention to the first obtuse marginal branch with
drug-eluting stent placement.
 
HOSPITAL COURSE:  The patient was admitted to the hospital with progressive
chest discomfort suggestive of unstable angina.  On catheterization, he was
found to have a high-grade 80-90% stenosis in the first obtuse marginal branch
distal to previously placed stent.  The patient underwent repeat intervention
with excellent result.  There were no complications.  The patient had already
been on dual antiplatelet therapy in the form of Effient and aspirin.  This was
continued.
 
DISCHARGE MEDICATIONS:  Effient 10 mg daily, aspirin 81 mg daily, levothyroxine
112 mcg daily, albuterol inhaler q.4 hours p.r.n., Symbicort inhaler b.i.d.,
vitamin D3 5000 units daily, Protonix 40 mg daily, furosemide 40 mg p.r.n.,
multivitamin 1 tablet daily, prednisone 5 mg p.r.n., carvedilol 3.125 mg b.i.d.,
Rituxan q.6 months, Nitrostat 0.4 mg sublingual p.r.n., atorvastatin 40 mg at
bedtime.
 
DISPOSITION:  The patient will follow up in 4 weeks.
 
 
 
 
 
<ELECTRONICALLY SIGNED>
                                        By:  Hank Cleveland MD, FACC   
06/29/20     0808
D: 06/26/20 0850_______________________________________
T: 06/26/20 0858Miccalli Cleveland MD, FACC      /nt

## 2020-08-11 ENCOUNTER — HOSPITAL ENCOUNTER (OUTPATIENT)
Dept: HOSPITAL 96 - M.LAB | Age: 65
End: 2020-08-11
Attending: SURGERY
Payer: MEDICARE

## 2020-08-11 DIAGNOSIS — I70.213: Primary | ICD-10-CM

## 2020-08-11 DIAGNOSIS — J43.9: ICD-10-CM

## 2020-08-11 LAB
BUN SERPL-MCNC: 18 MG/DL (ref 7–18)
CREAT SERPL-MCNC: 1.2 MG/DL (ref 0.6–1.3)

## 2021-05-07 ENCOUNTER — HOSPITAL ENCOUNTER (OUTPATIENT)
Dept: HOSPITAL 96 - M.CT | Age: 66
End: 2021-05-07
Attending: INTERNAL MEDICINE
Payer: MEDICARE

## 2021-05-07 DIAGNOSIS — I25.10: ICD-10-CM

## 2021-05-07 DIAGNOSIS — K74.60: ICD-10-CM

## 2021-05-07 DIAGNOSIS — J43.9: ICD-10-CM

## 2021-05-07 DIAGNOSIS — I71.2: Primary | ICD-10-CM

## 2021-05-07 DIAGNOSIS — I77.810: ICD-10-CM

## 2021-11-09 ENCOUNTER — HOSPITAL ENCOUNTER (OUTPATIENT)
Dept: HOSPITAL 96 - M.CL | Age: 66
Discharge: HOME | End: 2021-11-09
Attending: INTERNAL MEDICINE
Payer: MEDICARE

## 2021-11-09 VITALS — DIASTOLIC BLOOD PRESSURE: 73 MMHG | SYSTOLIC BLOOD PRESSURE: 107 MMHG

## 2021-11-09 VITALS — SYSTOLIC BLOOD PRESSURE: 95 MMHG | DIASTOLIC BLOOD PRESSURE: 61 MMHG

## 2021-11-09 VITALS — SYSTOLIC BLOOD PRESSURE: 103 MMHG | DIASTOLIC BLOOD PRESSURE: 66 MMHG

## 2021-11-09 VITALS — HEIGHT: 71 IN | BODY MASS INDEX: 31.36 KG/M2 | WEIGHT: 224 LBS

## 2021-11-09 VITALS — DIASTOLIC BLOOD PRESSURE: 63 MMHG | SYSTOLIC BLOOD PRESSURE: 101 MMHG

## 2021-11-09 VITALS — DIASTOLIC BLOOD PRESSURE: 67 MMHG | SYSTOLIC BLOOD PRESSURE: 101 MMHG

## 2021-11-09 VITALS — DIASTOLIC BLOOD PRESSURE: 68 MMHG | SYSTOLIC BLOOD PRESSURE: 102 MMHG

## 2021-11-09 DIAGNOSIS — I25.10: ICD-10-CM

## 2021-11-09 DIAGNOSIS — M81.0: ICD-10-CM

## 2021-11-09 DIAGNOSIS — Z79.82: ICD-10-CM

## 2021-11-09 DIAGNOSIS — Z79.01: ICD-10-CM

## 2021-11-09 DIAGNOSIS — I50.22: ICD-10-CM

## 2021-11-09 DIAGNOSIS — I25.2: ICD-10-CM

## 2021-11-09 DIAGNOSIS — Z79.899: ICD-10-CM

## 2021-11-09 DIAGNOSIS — Z88.8: ICD-10-CM

## 2021-11-09 DIAGNOSIS — M19.90: ICD-10-CM

## 2021-11-09 DIAGNOSIS — M06.9: ICD-10-CM

## 2021-11-09 DIAGNOSIS — I25.5: Primary | ICD-10-CM

## 2021-11-09 DIAGNOSIS — Z88.0: ICD-10-CM

## 2021-11-09 DIAGNOSIS — Z98.890: ICD-10-CM

## 2021-11-09 LAB
ALBUMIN SERPL-MCNC: 3.4 G/DL (ref 3.4–5)
ALP SERPL-CCNC: 69 U/L (ref 46–116)
ALT SERPL-CCNC: 21 U/L (ref 30–65)
ANION GAP SERPL CALC-SCNC: 7 MMOL/L (ref 7–16)
APTT BLD: 27.3 SECONDS (ref 25–31.3)
AST SERPL-CCNC: 16 U/L (ref 15–37)
BILIRUB SERPL-MCNC: 0.7 MG/DL
BUN SERPL-MCNC: 14 MG/DL (ref 7–18)
CALCIUM SERPL-MCNC: 8.6 MG/DL (ref 8.5–10.1)
CHLORIDE SERPL-SCNC: 107 MMOL/L (ref 98–107)
CO2 SERPL-SCNC: 27 MMOL/L (ref 21–32)
CREAT SERPL-MCNC: 1 MG/DL (ref 0.6–1.3)
GLUCOSE SERPL-MCNC: 111 MG/DL (ref 70–99)
HCT VFR BLD CALC: 39.4 % (ref 42–52)
HGB BLD-MCNC: 13.7 GM/DL (ref 14–18)
INR PPP: 1.1
MCH RBC QN AUTO: 32.2 PG (ref 26–34)
MCHC RBC AUTO-ENTMCNC: 34.7 G/DL (ref 28–37)
MCV RBC: 92.7 FL (ref 80–100)
MPV: 7.1 FL. (ref 7.2–11.1)
PLATELET COUNT*: 187 THOU/UL (ref 150–400)
POTASSIUM SERPL-SCNC: 4.4 MMOL/L (ref 3.5–5.1)
PROT SERPL-MCNC: 6.1 G/DL (ref 6.4–8.2)
PROTHROMBIN TIME: 11.1 SECONDS (ref 9.2–11.5)
RBC # BLD AUTO: 4.25 MIL/UL (ref 4.5–6)
RDW-CV: 12.6 % (ref 10.5–14.5)
SODIUM SERPL-SCNC: 141 MMOL/L (ref 136–145)
WBC # BLD AUTO: 6.1 THOU/UL (ref 4–11)

## 2021-11-09 NOTE — EKG
[place] [address]
Phone:  [phone]                     ELECTROCARDIOGRAM REPORT      
_______________________________________________________________________________
 
Name:         MARILOU MCCAIN             Room:                     REG CLI
M.R.#:    [id number]     Account #:     [id number]  
Admission:    21    Attend Phys:   Hank Cleveland,
Discharge:                Date of Birth: [birth year]  
Date of Service: 2156  Report #:      [id number]
        [id number]
_______________________________________________________________________________
THIS REPORT FOR:  //name//                      
 
                          [facility]
                                       
Test Date:    2021               Test Time:    08:56:16
Pat Name:     MARILOU MCCAIN          Department:   
Patient ID:   [id number]            Room:          
Gender:                               Technician:   
:          1955               Requested By: Hank Cleveland
Order Number: [id number]    Reading MD:   Karri Abbasi
                                 Measurements
Intervals                              Axis          
Rate:         62                       P:            40
OH:           173                      QRS:          -26
QRSD:         96                       T:            55
QT:           618                                    
QTc:          628                                    
                           Interpretive Statements
Sinus rhythm
poor r wave progression
Inferior infarct, old
Lateral leads are also involved
Prolonged QT interval
Baseline wander in lead(s) I,II,aVR,aVF
Compared to ECG 2020 08:21:19
 
Prolonged QT interval now present
Electronically Signed On 2021 10:06:48 CST by Karri Abbasi
[email or web address]
 
 
 
 
 
 
 
 
 
 
 
 
 
 
 
  <ELECTRONICALLY SIGNED>
                                           By: Karri Abbasi MD, FACC      
  21     1006
D: 21 0856   _____________________________________
T: 21 0856   Karri Abbasi MD, FACC        /EPI

## 2021-11-09 NOTE — CARD
[address]  [address]                    CARDIAC CATH REPORT           
_______________________________________________________________________________
 
Name:       MARILOU MCCAIN              Room:                      REG CLI 
M.R.#:  [id number]      Account #:      [id number]  
Admission:  11/09/21     Attend Phys:    Hank Cleveland MD
Discharge:               Date of Birth:  [birth year]  
         Report #: [id number]
                                                                     [id number]
_______________________________________________________________________________
THIS REPORT FOR:  
 
cc:  Myles Schroeder Bradley L. DO Liston, Michael J. MD [facility]                                         ~
 
 
--------------- APPROVED REPORT --------------
 
 
Study performed:  11/09/2021 09:39:23
 
Patient Status: Out-Patient       Room #: 
Event Personnel: Raquel Osman RN, Pravin Sam RTR, Yael Swanson RTR
Exam: Insertion of a single lead ICD.
Indications: Primary prevention.
 
The patient is a 65 year-old male with a history of Ischemic 
cardiomyopathy.
 
Patient Info
Last EF%: 30%  Date: 10/13/2021
BUN: 14  Creatine: 1.0
NYHA Heart Class: II
Reason for implant: Primary prevention
 
Intraoperative Conscious Sedation
Sedation start time:  1012           Case end Time:  
1046    
Fentanyl  50.0 mcg    Versed  2.0 mg  
1 gram Vancomycin given prior to procedure start    
 
Implanted Devices:  Medtronic Primo MRI VR SureScan ICD Model [id number] 
   SN - [id number]
Medtronic ICD RV Lead Model 6935M-62cm  SN - 
[id number]
 
Procedure
After explaining the risks, benefits, and alternative options, 
informed consent was obtained from the patient.
The patient was brought to the cardiac catheterization lab and the 
leftright chest and shoulder were prepped and draped in the usual 
fashion.
During this case, Fluoroscopy and Iso-osmolar contrast were used for 
imaging.
 
 
 
[place] [address]                    CARDIAC CATH REPORT           
_______________________________________________________________________________
 
Name:       MARILOU MCCAIN              Room:                      REG CLI 
M.R.#:  [id number]      Account #:      [id number]  
Admission:  11/09/21     Attend Phys:    Hank Cleveland MD
Discharge:               Date of Birth:  [birth year]  
         Report #: [id number]
                                                                     [id number]
_______________________________________________________________________________
 
IV conscious sedation was used throughout procedure with appropriate 
monitoring and was performed in the presence of a registered nurse 
who was an independent trained observer other than the physician 
performing the procedure. 
After informed consent was obtained the area of the left chest was 
prepped and draped in sterile fashion.  Local anesthesia was achieved 
with 1% lidocaine.  After an initial incision was made a device 
pocket was formed over the left pectoralis muscle using 
electrocautery and blunt dissection.  Once the pocket was formed 
access to the left subclavian vein was obtained using a micropuncture 
kit.  Ultimately a 7 Slovenian tear-away introducer was placed using the 
Seldinger technique.  A pacing ICD lead was then advanced to a secure 
position within the right ventricular apex.  The lead was actively 
fixed.  Thresholds were checked and deemed to be satisfactory.  
Adequate sensing was assured.  There was no diaphragmatic stimulation 
with maximum output pacing.  After adequate slack was assured the 
device was secured within the pocket using the designated cuff and 0 
silk suture.  The pocket was then flushed with antibiotic solution.  
Next a single chamber ICD generator was attached to the lead.  The 
redundant lead and generator were placed within the device pocket.  
The deep tissues were closed using interrupted stitches of 2-0 
Vicryl.  The skin incision was then closed with a single subcuticular 
stitch of 4-0 Vicryl.  Several Steri-Strips were then placed across 
the incision and a sterile Telfa dressing covered with a Tegaderm.  
The patient tolerated the procedure well without 
complication.
 
Complications
The patient tolerated the procedure well and there were no 
complications associated with the procedure. 
 
Findings
Specimens Removed:  No   
Estimated Blood Loss:  minimal
The sensed R wave was 4.1 mV.  
Pacing threshold was 0.75 V at 0.40 ms.  
The pacing lead impedance was 456 ohms.  
Shocking lead impedance 62 ohms.
 
Conclusion
1.  Ischemic cardiomyopathy with EF of 30%.
2.  Successful placement of a single-chamber ICD for primary 
prevention.
1.  Follow-up site check 
 
 
 
 
[place] [address]                    CARDIAC CATH REPORT           
_______________________________________________________________________________
 
Name:       MARILOU MCCAIN              Room:                      REG CLI 
M.RCy#:  [id number]      Account #:      [id number]  
Admission:  11/09/21     Attend Phys:    Hank Cleveland MD
Discharge:               Date of Birth:  [birth year]  
         Report #: [id number]
                                                                     [id number]
_______________________________________________________________________________
 
Recommendations
In 1 week.
2.  Follow-up device interrogation in 1 month.
 
 
 
 
 
 
 
 
 
 
 
 
 
 
 
 
 
 
 
 
 
 
 
 
 
 
 
 
 
 
 
 
 
 
 
 
 
 
 
 
<ELECTRONICALLY SIGNED>
                                        By:  Hank Cleveland MD, FACC   
11/09/21     1451
D: 11/09/21 1451_______________________________________
T: 11/09/21 1451Michaezarina Cleveland MD, FACC      /INF

## 2024-02-25 NOTE — 2DMMODE
[place] [address]
Phone:  [phone] 2 D/M-MODE ECHOCARDIOGRAM     
_______________________________________________________________________________
 
Name:         MITCHELL,HUBERT DUANE         Room:                     REG CLI
M.R.#:    [id number]     Account #:     [id number]  
Admission:    19    Attend Phys:   Hank Cleveland,
Discharge:                Date of Birth: [birth year]  
Date of Service: 19 1009  Report #:      [id number]
        [id number]
_______________________________________________________________________________
THIS REPORT FOR:  //name//                      
 
 
--------------- APPROVED REPORT --------------
 
 
Study performed:  2019 09:01:00
 
EXAM: Comprehensive 2D, Doppler, and color-flow 
Echocardiogram 
Patient Location: Out-Patient   
 
      BSA:         2.09
HR: 62 bpm BP:          120/60 mmHg 
 
Other Information 
Study Quality: Fair
 
Indications
Cardiomyopathy
 
2D Dimensions
IVSd:  11.92 (7-11mm) LVOT Diam:  20.10 (18-24mm) 
LVDd:  60.22 mm  
PWd:  9.88 (7-11mm) Ascending Ao:  34.09 (22-36mm)
LVDs:  47.31 (25-40mm) 
Aortic Root:  27.53 mm 
 
Volumes
Left Atrial Volume (Systole) 
    LA ESV Index:  16.10 mL/m2
 
Aortic Valve
AoV Peak Jose Luis.:  0.93 m/s 
AO Peak Gr.:  3.44 mmHg  LVOT Max P.72 mmHg
AO Mean Gr.:  2.31 mmHg  LVOT Mean P.80 mmHg
    LVOT Max V:  0.66 m/s
AO V2 VTI:  19.41 cm  LVOT Mean V:  0.41 m/s
ROBLES (VTI):  2.23 cm2  LVOT V1 VTI:  13.63 cm
 
Mitral Valve
    E/A Ratio:  0.52
    MV Decel. Time:  337.73 ms
MV E Max Jose Luis.:  0.39 m/s 
MV PHT:  97.94 ms  
MVA (PHT):  2.25 cm2  
 
 
[place] [address]
Phone:  [phone]                     2 D/M-MODE ECHOCARDIOGRAM     
_______________________________________________________________________________
 
Name:         ADÁN,HUBERT DUANE         Room:                     REG CLI
M.R.#:    [id number]     Account #:     [id number]  
Admission:    19    Attend Phys:   Hank Cleveland,
Discharge:                Date of Birth: [birth year]  
Date of Service: 19 1009  Report #:      [id number]
        [id number]
_______________________________________________________________________________
 
TDI
E/Lateral E':  5.57 E/Medial E':  7.80
   Medial E' Jose Luis.:  0.05 m/s
   Lateral E' Jose Luis.:  0.07 m/s
 
Pulmonary Valve
PV Peak Jose Luis.:  0.60 m/s PV Peak Gr.:  1.42 mmHg
 
Left Ventricle
Left ventricle is moderately dilated. There is moderately severe 
diffuse hypokinesis of left ventricular wall motion. There is normal 
left ventricular wall thickness. Left ventricular systolic function 
is moderate to severely decreased. LVEF is 30-35% Grade I - abnormal 
relaxation pattern.
 
Right Ventricle
The right ventricle is normal size. The right ventricular systolic 
function is normal.
 
Atria
The left atrium size is normal. The right atrium size is 
normal.
 
Aortic Valve
The aortic valve is normal in structure. Mild aortic regurgitation. 
There is no aortic valvular stenosis.
 
Mitral Valve
The mitral valve is normal in structure. There is no mitral valve 
regurgitation noted. No evidence of mitral valve stenosis.
 
Tricuspid Valve
The tricuspid valve is normal in structure. There is no tricuspid 
valve regurgitation noted.
 
Pulmonic Valve
The pulmonary valve is normal in structure. There is no pulmonic 
valvular regurgitation.
 
Great Vessels
The aortic root is normal in size. IVC is normal in size and 
collapses >50% with inspiration.
 
Pericardium
There is no pericardial effusion.
 
 
[place] [address]
Phone:  [phone] 2 D/M-MODE ECHOCARDIOGRAM     
_______________________________________________________________________________
 
Name:         MITCHELL,HUBERT DUANE         Room:                     REG CLI
M.R.#:    [id number]     Account #:     [id number]  
Admission:    19    Attend Phys:   Hank Cleveland,
Discharge:                Date of Birth: [birth year]  
Date of Service: 19 1009  Report #:      [id number]
        [id number]
_______________________________________________________________________________
 
<Conclusion>
Left ventricle is moderately dilated.
There is normal left ventricular wall thickness.
Left ventricular systolic function is moderate to severely 
decreased.
LVEF is 30-35%
Grade I - abnormal relaxation pattern.
The right ventricle is normal size.
The left atrium size is normal.
The aortic valve is normal in structure.
Mild aortic regurgitation.
There is no aortic valvular stenosis.
The mitral valve is normal in structure.
The tricuspid valve is normal in structure.
IVC is normal in size and collapses >50% with inspiration.
There is no pericardial effusion.
There is moderately severe diffuse hypokinesis of left ventricular 
wall  motion.
 
 
 
 
 
 
 
 
 
 
 
 
 
 
 
 
 
 
 
 
 
 
 
 
 
  <ELECTRONICALLY SIGNED>
                                           By: Laron Busby MD, [facility]     
  19     1009
D: 19 100   _____________________________________
T: 19 100   Laron Busby MD, FAC       /INF
No